# Patient Record
Sex: FEMALE | Race: WHITE | NOT HISPANIC OR LATINO | Employment: UNEMPLOYED | URBAN - NONMETROPOLITAN AREA
[De-identification: names, ages, dates, MRNs, and addresses within clinical notes are randomized per-mention and may not be internally consistent; named-entity substitution may affect disease eponyms.]

---

## 2017-04-24 ENCOUNTER — HISTORY (OUTPATIENT)
Dept: FAMILY MEDICINE | Facility: OTHER | Age: 42
End: 2017-04-24

## 2017-04-24 ENCOUNTER — OFFICE VISIT - GICH (OUTPATIENT)
Dept: FAMILY MEDICINE | Facility: OTHER | Age: 42
End: 2017-04-24

## 2017-04-24 DIAGNOSIS — R09.81 NASAL CONGESTION: ICD-10-CM

## 2017-04-24 DIAGNOSIS — H92.12 OTORRHEA OF LEFT EAR: ICD-10-CM

## 2017-04-24 DIAGNOSIS — H93.8X2 OTHER SPECIFIED DISORDERS OF LEFT EAR: ICD-10-CM

## 2017-10-02 ENCOUNTER — HISTORY (OUTPATIENT)
Dept: FAMILY MEDICINE | Facility: OTHER | Age: 42
End: 2017-10-02

## 2017-10-02 ENCOUNTER — OFFICE VISIT - GICH (OUTPATIENT)
Dept: FAMILY MEDICINE | Facility: OTHER | Age: 42
End: 2017-10-02

## 2017-10-02 DIAGNOSIS — L02.91 CUTANEOUS ABSCESS: ICD-10-CM

## 2017-11-07 ENCOUNTER — AMBULATORY - GICH (OUTPATIENT)
Dept: RADIOLOGY | Facility: OTHER | Age: 42
End: 2017-11-07

## 2017-11-07 DIAGNOSIS — Z12.31 ENCOUNTER FOR SCREENING MAMMOGRAM FOR MALIGNANT NEOPLASM OF BREAST: ICD-10-CM

## 2017-12-28 NOTE — PROGRESS NOTES
Patient Information     Patient Name MRN Sex Liliana Lawson [id number] Female 1975      Progress Notes by Chari Shabazz NP at 10/2/2017  4:15 PM     Author:  Chari Shabazz NP Service:  (none) Author Type:  PHYS- Nurse Practitioner     Filed:  10/2/2017  5:23 PM Encounter Date:  10/2/2017 Status:  Signed     :  Chari Shabazz NP (PHYS- Nurse Practitioner)            Nursing Notes:   Nathalie Mcmahon  10/2/2017  4:45 PM  Signed  Patient presents to the clinic for a sore on her neck that started 3 days ago.   Nathalie MARINELLI, CMA.......10/2/2017..4:31 PM    SUBJECTIVE:    Liliana Yao is a 42 y.o. female who presents for sore on neck    Abscess    This is a new problem. Episode onset: 3 days. The onset was sudden. The problem has been unchanged. The abscess is present on the neck. The problem is moderate. The abscess is characterized by painfulness, swelling and draining. It is unknown what she was exposed to. The abscess first occurred at home. Pertinent negatives include no anorexia, no decrease in physical activity, not sleeping less, not drinking less, no fever, no fussiness, no vomiting, no congestion, no rhinorrhea, no sore throat, no decreased responsiveness and no cough. Her past medical history does not include atopy in family or skin abscesses in family. There were no sick contacts. She has received no recent medical care.       Current Outpatient Prescriptions on File Prior to Visit       Medication  Sig Dispense Refill     aspirin chewable 81 mg chewable tablet Take 81 mg by mouth once daily with a meal.       blood sugar diagnostic (ONE TOUCH ULTRA TEST) strip As directed. Dispense test strips covered by the patient insurance. Test 3-4 times per day.       Cholecalciferol, Vitamin D3, (VITAMIN D-3) 5,000 unit tab Take 1 tablet by mouth once daily.  0     dextromethorphan-guaFENesin (ROBITUSSIN DM) ( mg in 5 mL) Take 10 mL by mouth every 6 hours if needed. 150 mL 0      "ergocalciferol (VITAMIN D2; DRISDOL) 50,000 unit capsule Take 1 capsule by mouth once weekly. 8 capsule 0     escitalopram (LEXAPRO) 20 mg tablet Take 20 mg by mouth once daily.       fluticasone (50 mcg per actuation) nasal solution (FLONASE) Inhale 1 Spray into both nostrils once daily. 1 Bottle 1     glipiZIDE extended-release (GLUCOTROL XL) 10 mg Extended-Release tablet TAKE 1 TABLET BY MOUTH ONCE DAILY BEFORE A MEAL 90 tablet 1     hydrOXYzine pamoate (VISTARIL) 25 mg capsule Take 1 capsule by mouth every 8 hours if needed.  0     levothyroxine (SYNTHROID) 75 mcg tablet TAKE 1 TABLET BY MOUTH ONCE DAILY 90 tablet 0     lisinopril (PRINIVIL; ZESTRIL) 10 mg tablet TAKE ONE TABLET BY MOUTH EVERY DAY 15 tablet 0     LORazepam (ATIVAN) 1 mg tablet Take 1 tablet by mouth 3 times daily.  0     metFORMIN (GLUCOPHAGE) 500 mg tablet TAKE 2 TABLETS BY MOUTH TWICE DAILY 120 tablet 0     NEEDLES, INSULIN DISPOSABLE (PEN NEEDLES MISC) As directed. 31 G x 6 MM . Use with Victoza to inject subcu once daily        simvastatin (ZOCOR) 20 mg tablet TAKE 1 TABLET BY MOUTH EVERY DAY 90 tablet 0     No current facility-administered medications on file prior to visit.        REVIEW OF SYSTEMS:  Review of Systems   Constitutional: Negative for decreased responsiveness and fever.   HENT: Negative for congestion, rhinorrhea and sore throat.    Respiratory: Negative for cough.    Gastrointestinal: Negative for anorexia and vomiting.       OBJECTIVE:  /62  Pulse 92  Temp 97.8  F (36.6  C) (Tympanic)  Ht 1.518 m (4' 11.75\")  Wt 85.7 kg (189 lb)  BMI 37.22 kg/m2    EXAM:   Physical Exam   Constitutional: She is well-developed, well-nourished, and in no distress.   HENT:   Head: Normocephalic and atraumatic.   Eyes: Conjunctivae are normal.   Cardiovascular: Normal rate.    Pulmonary/Chest: Effort normal. No respiratory distress.   Skin: Skin is warm and dry. There is erythema.   1 cm raised, red indurated lesion on the RT lateral " neck. No drainage, firm feeling. She states her SO already squeezed it last night.    Nursing note and vitals reviewed.      ASSESSMENT/PLAN:    ICD-10-CM    1. Abscess L02.91 mupirocin 2% topical (BACTROBAN) ointment        Plan:  Appears as though it could have started out as an ingrown hair. Topical rx sent in. No need for oral given size and appearance. F/u if worsens. Home cares and OTC gone over. I explained my diagnostic considerations and recommendations to the patient, who voiced understanding and agreement with the treatment plan. All questions were answered. We discussed potential side effects of any prescribed or recommended therapies, as well as expectations for response to treatments. She was advised to contact our office if there is no improvement or worsening of conditions or symptoms.  If s/s worsen or persist, patient will either come back or follow up with PCP.         UMA VENEGAS NP ....................  10/2/2017   5:23 PM

## 2017-12-28 NOTE — PATIENT INSTRUCTIONS
Patient Information     Patient Name MRN Liliana Lopez [id number] Female 1975      Patient Instructions by Chari Shabazz NP at 10/2/2017  4:15 PM     Author:  Chari Shabazz NP Service:  (none) Author Type:  PHYS- Nurse Practitioner     Filed:  10/2/2017  4:48 PM Encounter Date:  10/2/2017 Status:  Signed     :  Chari Shabazz NP (PHYS- Nurse Practitioner)            Topical ointment three times a day    Hot pack as needed.     Wash with antibacterial soap three times a day    OTC meds like Ibuprofen and Tylenol can help.

## 2017-12-30 NOTE — NURSING NOTE
Patient Information     Patient Name MRN Liliana Lopez [id number] Female 1975      Nursing Note by Nathalie Mcmahon at 10/2/2017  4:15 PM     Author:  Nathalie Mcmahon Service:  (none) Author Type:  (none)     Filed:  10/2/2017  4:45 PM Encounter Date:  10/2/2017 Status:  Signed     :  Nathalie Mcmahon            Patient presents to the clinic for a sore on her neck that started 3 days ago.   Nathalie MARINELLI, GONSALO.......10/2/2017..4:31 PM

## 2018-01-02 ENCOUNTER — OFFICE VISIT - GICH (OUTPATIENT)
Dept: FAMILY MEDICINE | Facility: OTHER | Age: 43
End: 2018-01-02

## 2018-01-02 ENCOUNTER — HISTORY (OUTPATIENT)
Dept: FAMILY MEDICINE | Facility: OTHER | Age: 43
End: 2018-01-02

## 2018-01-02 DIAGNOSIS — H66.92 OTITIS MEDIA OF LEFT EAR: ICD-10-CM

## 2018-01-04 NOTE — NURSING NOTE
Patient Information     Patient Name MRN Liliana Lopez [id number] Female 1975      Nursing Note by Sophie Dee at 2017  3:45 PM     Author:  Sophie Dee Service:  (none) Author Type:  (none)     Filed:  2017  4:24 PM Encounter Date:  2017 Status:  Signed     :  Sophie Dee            Patient reports that there is blood in her left ear. She noticed this yesterday.  Sophie Dee LPN ....................2017  4:07 PM

## 2018-01-04 NOTE — PROGRESS NOTES
Patient Information     Patient Name MRN Sex     Liliana Yao [id number] Female 1975      Progress Notes by Rosi Holcomb NP at 2017  3:45 PM     Author:  Rosi Holcomb NP Service:  (none) Author Type:  PHYS- Nurse Practitioner     Filed:  2017  8:36 PM Encounter Date:  2017 Status:  Signed     :  Rosi Holcomb NP (PHYS- Nurse Practitioner)            HPI:    Liliana Yao is a 41 y.o. female who presents to clinic today for what she thought was blood in the left ear. Saw it yesterday. Ear has been hurting off and on since yesterday. No sore throat, fever, headache. States cleans ears with Q-tips and looked like blood on Q-tip.   Nursing Notes:   Sophie Dee  2017  4:24 PM  Signed  Patient reports that there is blood in her left ear. She noticed this yesterday.  Sophie Dee LPN ....................2017  4:07 PM        Past Medical History:     Diagnosis  Date     ALLERGIC RHINITIS 2010     Cyst     Right palmar ganglion cyst      Delay, coagulation (HC)     She has cognitive delay      Diarrhea     Diarrhea since       Encounter for complete foot exam by podiatrist or other provider     Foot exam negative.  The patient is due for her annual eye exam.       Hx of self mutilation     none in last 10 years      Obesity     Morbid obesity, noncompliant      OTITIS MEDIA, BILATERAL 2012     Reactive airway disease      Wryneck     She has recurrent wryneck      Past Surgical History:      Procedure  Laterality Date     CYST REMOVAL      The patient had right ganglion cyst removal.       TYMPANOSTOMY      PE tubes at age 4 & 24       TYMPANOSTOMY  2008    Tube placement by Dr. Paco Izquierdo       Social History        Substance Use Topics          Smoking status:   Former Smoker      Packs/day:  0.50      Years:  12.00      Types:  Cigarettes      Quit date:  1997      Smokeless tobacco:   Never Used      Alcohol use   0.6 oz/week     1  Standard drinks or equivalent per week        Comment: 1 per month        Current Outpatient Prescriptions       Medication  Sig Dispense Refill     aspirin chewable 81 mg chewable tablet Take 81 mg by mouth once daily with a meal.       blood sugar diagnostic (ONE TOUCH ULTRA TEST) strip As directed. Dispense test strips covered by the patient insurance. Test 3-4 times per day.       Cholecalciferol, Vitamin D3, (VITAMIN D-3) 5,000 unit tab Take 1 tablet by mouth once daily.  0     dextromethorphan-guaFENesin (ROBITUSSIN DM) ( mg in 5 mL) Take 10 mL by mouth every 6 hours if needed. 150 mL 0     ergocalciferol (VITAMIN D2; DRISDOL) 50,000 unit capsule Take 1 capsule by mouth once weekly. 8 capsule 0     escitalopram (LEXAPRO) 20 mg tablet Take 20 mg by mouth once daily.       fluticasone (50 mcg per actuation) nasal solution (FLONASE) Inhale 1 Spray into both nostrils once daily. 1 Bottle 1     glipiZIDE extended-release (GLUCOTROL XL) 10 mg Extended-Release tablet TAKE 1 TABLET BY MOUTH ONCE DAILY BEFORE A MEAL 90 tablet 1     hydrOXYzine pamoate (VISTARIL) 25 mg capsule Take 1 capsule by mouth every 8 hours if needed.  0     levothyroxine (SYNTHROID) 75 mcg tablet TAKE 1 TABLET BY MOUTH ONCE DAILY 90 tablet 0     lisinopril (PRINIVIL; ZESTRIL) 10 mg tablet TAKE ONE TABLET BY MOUTH EVERY DAY 15 tablet 0     LORazepam (ATIVAN) 1 mg tablet Take 1 tablet by mouth 3 times daily.  0     metFORMIN (GLUCOPHAGE) 500 mg tablet TAKE 2 TABLETS BY MOUTH TWICE DAILY 120 tablet 0     NEEDLES, INSULIN DISPOSABLE (PEN NEEDLES MISC) As directed. 31 G x 6 MM . Use with Victoza to inject subcu once daily        simvastatin (ZOCOR) 20 mg tablet TAKE 1 TABLET BY MOUTH EVERY DAY 90 tablet 0     No current facility-administered medications for this visit.      Medications have been reviewed by me and are current to the best of my knowledge and ability.    Allergies      Allergen   Reactions     Acetone  Shortness Of Breath and  "Erythema     Sulindac  Rash     weakness        ROS:  Refer to HPI    /80  Pulse 78  Temp 97.7  F (36.5  C) (Tympanic)   Ht 1.422 m (4' 8\")  Wt 88.5 kg (195 lb)  BMI 43.72 kg/m2    EXAM:  General Appearance: Well appearing female, appropriate appearance for age. No acute distress  Head: normocephalic, atraumatic  Ears: Left TM with bony landmarks appreciated with cone of light, no erythema, dull, mild bulging, no purulence, no blood, scaring noted from previous tube.  Right TM with bony landmarks appreciated with cone of light, no erythema, dull, no bulging, no purulence.   Left auditory canal clear, Right auditory canal clear, normal external ears, non tender.  Eyes: conjunctivae normal, no drainage  Orophayrnx: moist mucous membranes, posterior pharynx, tonsils without hypertrophy, no erythema, no exudates or petechiae,  post nasal drip seen.  No sinus pain upon palpation of the frontal, maxillary, or ethmoid sinuses  Neck: supple without adenopathy  Respiratory: normal chest wall and respirations.  Normal effort.  Clear to auscultation bilaterally, no wheezes or rhonchi or congestion, no cough appreciated,   Cardiac: RRR with no murmurs  Musculoskeletal:full ROM  Dermatological: no rashes or lesions  Psychological: normal affect, alert and pleasant    ASSESSMENT/PLAN:    ICD-10-CM    1. Ear drainage, left H92.12    2. Ear congestion, left H93.8X2    3. Congestion of nasal sinus R09.81      Findings of exam discussed. To stop using Q-tips.    Symptomatic treatments: Take Sudafed as directed. May apply warm wash cloths to face and side of head. Steam therapy  Tylenol or ibuprofen PRN  Follow up if symptoms persist or worsen  States understanding.    Patient Instructions      Index [place]   Earache   ________________________________________________________________________  KEY POINTS    An earache is pain inside or around the ear. It may come and go or it may be constant.    The symptoms of ear " infections often go away in a couple of days. If you have earwax or something stuck in your ear, it should be removed.    If you are taking an antibiotic, take the medicine for as long as your healthcare provider prescribes, even if you feel better.    Ask your provider if there are activities you should avoid and when you can return to your normal activities.  ________________________________________________________________________  What is an earache?  An earache is pain inside or around the ear. It may come and go or it may be constant. You may also have decreased hearing or a feeling of pressure or blockage.   What is the cause?  Earaches can be caused by:    Injury to the ear or jaw    A problem with your teeth, usually in the upper molars    Changes in altitude or air pressure, for example, when you fly in an airplane or drive up into a mountain area    Cold air, wind, or a sudden change in temperature, such as going into a warm room after you have been outdoors in cold weather    A buildup of earwax    Having something stuck in your ear  Middle ear infection is a common cause of earache. It often starts when you have a cold, sinus infection, or throat infection. The infection may cause other symptoms, such as green or yellow drainage from the nose, fever, dizziness, loss of appetite, hearing loss, and a feeling of blockage in the ear.  Your ear canal may get infected when moisture and bacteria or a fungus gets trapped in the ear. This infection can spread to the outer part of your ear. It can cause pain in or around your ear or when you touch your earlobe. You may have redness, swelling, or itching of the ear. You may also have drainage from your ear canal or trouble hearing.  How is it diagnosed?  Your healthcare provider will ask about your symptoms and medical history and examine you.   How is it treated?  The symptoms of ear infections often go away in a couple of days. Your healthcare provider may wait 1  to 3 days to see if the symptoms go away before prescribing an antibiotic. Taking antibiotics when you don t need them can cause problems.  If you have earwax or something stuck in your ear, it should be removed. There are products you can buy at most drug stores to help soften and remove earwax. It is very important that you not push earwax or a foreign object (like an insect) further into the ear. You may need to see your healthcare provider to remove the earwax or the object stuck in your ear.  Other treatments depend on the cause of the earache. For example, chewing gum, drinking fluids, or sucking on candy may help stop pain caused by temperature changes or the change in pressure when you are going up or coming down in an airplane. Another way to try to relieve pressure in the ear is to blow out gently while keeping your mouth closed and nose pinched.  How can I take care of myself?  Follow the full course of treatment prescribed by your healthcare provider. In addition:    If you are taking an antibiotic, take the medicine for as long as your healthcare provider prescribes, even if you feel better. If you stop taking the medicine too soon, you may not kill all of the bacteria and you may get sick again.    For pain relief, either a cold pack or cold wet cloth or a warm moist washcloth or a covered hot water bottle on the ear for 20 minutes may help.    Take nonprescription pain medicine, such as acetaminophen, ibuprofen, or naproxen. Read the label and take as directed. Unless recommended by your healthcare provider, you should not take these medicines for more than 10 days.    Nonsteroidal anti-inflammatory medicines (NSAIDs), such as ibuprofen, naproxen, and aspirin, may cause stomach bleeding and other problems. These risks increase with age.    Acetaminophen may cause liver damage or other problems. Unless recommended by your provider, don't take more than 3000 milligrams (mg) in 24 hours. To make sure you  don t take too much, check other medicines you take to see if they also contain acetaminophen. Ask your provider if you need to avoid drinking alcohol while taking this medicine.    Don t use any eardrops for an earache if there is drainage from the ear or there are tubes in the ears unless the drops are prescribed by your healthcare provider.  Ask your provider:    How long it will take to recover    If there are activities you should avoid and when you can return to your normal activities    How to take care of yourself at home    What symptoms or problems you should watch for and what to do if you have them  Make sure you know when you should come back for a checkup. Keep all appointments for provider visits or tests.  Developed by [employer].  Adult Advisor 2016.3 published by [employer].  Last modified: 2016-06-02  Last reviewed: 2014-09-04  This content is reviewed periodically and is subject to change as new health information becomes available. The information is intended to inform and educate and is not a replacement for medical evaluation, advice, diagnosis or treatment by a healthcare professional.  References   Adult Advisor 2016.3 Index    Copyright   2016 [employer], a division of [employer] All rights reserved.          Index Maori Related topics   Colds   ________________________________________________________________________  KEY POINTS    Colds are an infection of the head and chest caused by a virus. They can affect your nose, throat, sinuses, eyes, and ears, as well as your windpipe, voice box, and the airways in your lungs.    Most of the time, you don t need to see your healthcare provider for treatment. There are no medicines that cure a cold. Medicines that you can buy at most drugstores can help relieve your symptoms.    To prevent getting or spreading a cold, wash your hands often and especially after using the restroom, coughing, sneezing, or blowing your nose. Also  wash your hands before eating or touching your eyes.  ________________________________________________________________________  What are colds?  Colds are an infection of the head and chest caused by a virus. They are a type of upper respiratory infection (URI). They can affect your nose, throat, sinuses, eyes, and ears. A cold can also affect the tube that connects your middle ear and throat, as well as your windpipe, voice box, and the airways in your lungs.  What is the cause?  Over 200 different viruses can cause colds. The infection spreads when viruses are passed to others by sneezing, coughing, or touching. You may also become infected by handling objects that were touched by someone with a cold. Some of the cold viruses can live on the skin and on objects, such as doorknobs or telephones for hours.  You are more likely to get a cold if:    You are stressed.    You are tired.    You do not eat a healthy diet.    You are a smoker or are exposed to secondhand smoke.    You are living or working in crowded conditions.    You don t wash your hands often.  People tend to get fewer colds as they get older because they have already had many colds and their immune system is able to fight off some of the viruses that can cause colds.  What are the symptoms?  You usually start having cold symptoms 1 to 3 days after contact with a cold virus. Symptoms may include:    Scratchy or sore throat    Sneezing    Runny or stuffy nose    Cough    Watery eyes    Ears that feel stuffy or blocked    Slight fever (99 to 100 F, or 37.2 to 37.8 C)    Feeling tired    Headache    Loss of appetite  Cold and flu symptoms are similar. The difference is that when you have the flu, the symptoms start within a few hours. The symptoms of a cold develop more slowly.  How are they treated?  Most of the time, you don t need to see your healthcare provider for treatment. There are no medicines that cure a cold. Medicines that you can buy at most  drugstores can help relieve your symptoms. You can:    Get lots of rest.    Drink extra fluids, such as water, fruit juice, and tea.    Use a humidifier to put more moisture in the air. Avoid steam vaporizers because they can cause burns. Be sure to keep the humidifier clean, as recommended in the 's instructions. It's important to prevent bacteria and mold from growing in the water container.    Take nonprescription medicine, such as acetaminophen, ibuprofen, or naproxen to treat pain and fever. Read the label and take as directed. Unless recommended by your healthcare provider, you should not take these medicines for more than 10 days.    Nonsteroidal anti-inflammatory medicines (NSAIDs), such as ibuprofen, naproxen, and aspirin, may cause stomach bleeding and other problems. These risks increase with age.    Acetaminophen may cause liver damage or other problems. Unless recommended by your provider, don't take more than 3,000 milligrams (mg) in 24 hours. To make sure you don t take too much, check other medicines you take to see if they also contain acetaminophen. Ask your provider if you need to avoid drinking alcohol while taking this medicine.    Decongestants pills or nasal spray may reduce swelling in your nose and sinuses and lessen the amount of mucus. Use decongestants as directed. If you are using a nonprescription nasal-spray decongestant, generally you should not use it for more than 3 days. After 3 days it may make your symptoms worse. Ask your healthcare provider if it is OK for you to use a nasal spray decongestant longer than this.    Use cough drops, pain relievers, or salt water gargles for a sore throat. You can make a salt water gargle with 1 teaspoon table salt in 1 cup (8 ounces) of warm water.    You can buy many different medicines for coughs without a prescription. However, there is no proof that they will actually help your cough. Cough medicines may cause harm to young  "children, but they are generally safe for older children and adults.    If you need relief from a dry, hacking cough, choose a medicine labeled \"cough suppressant.\" A cough suppressant may help you cough less and sleep better. Cough medicines with the initials DM in the name contain the suppressant drug called dextromethorphan.    If you need to loosen mucus, choose a medicine labeled \"expectorant.\" Expectorants may help keep your mucus thin and bring it up from the lungs when you cough. This may relieve chest congestion and make it easier to breathe. The drug used most often as an expectorant is guaifenesin.    Do not give a child under age 4 any cough and cold medicines unless you have instructions from your healthcare provider. Children over 6 years of age may be given cough drops or hard candies to relieve a sore throat or cough.    If you are pregnant, check first with your healthcare provider before taking any cold or cough medicines.  Colds usually last 1 to 2 weeks. Contact your healthcare provider if you have new or worsening symptoms or your symptoms last longer than 2 weeks.  How can I help prevent colds?  If you are sick, you can help protect others if you:    Don t go to work or school. Avoid contact with other people except to get medical care.    Cover your nose and mouth with a tissue when you cough or sneeze. Throw the tissue in the trash after you use it, and then wash your hands. If you don t have a tissue, cough or sneeze into your upper sleeve instead of your hands.    Clean your hands often with soap and water or an alcohol-based hand , especially after using tissues or coughing or sneezing into your hands.  To lower your risk of catching a cold:    Wash your hands often and especially after using the restroom, coughing, sneezing, or blowing your nose. Also wash your hands before eating or touching your eyes.    Stay at least 6 feet away from people who are sick, if you can.    Keep " surfaces clean--especially bedside tables, surfaces in the bathroom, and toys for children. Some viruses and bacteria can live for hours on surfaces like cafeteria tables, doorknobs, and desks. Wipe them down with a household disinfectant according to directions on the label.    Take care of your health. Try to get at least 7 to 9 hours of sleep each night. Eat a healthy diet and try to keep a healthy weight. If you smoke, try to quit. If you want to drink alcohol, ask your healthcare provider how much is safe for you to drink. Learn ways to manage stress. Exercise according to your healthcare provider's instructions.  Developed by [employer].  Adult Advisor 2016.3 published by [employer].  Last modified: 2016-07-28  Last reviewed: 2016-07-28  This content is reviewed periodically and is subject to change as new health information becomes available. The information is intended to inform and educate and is not a replacement for medical evaluation, advice, diagnosis or treatment by a healthcare professional.  References   Adult Advisor 2016.3 Index    Copyright   2016 [employer], a division of [employer] All rights reserved.         Sinus congestion/drainage:    Treat symptomatically with warm salt water gargles, Lozenges, and cough syrup per package direction. Using a humidifier or steam therapy by tea pot works well to break up the congestion.  Use a humidifier in your bedroom at night. You can also sleep propped up on a couple pillows to decrease symptoms at night.     Use a Neti Pot/sinus flush (Efren Med Sinus Rinse) 3 times daily to irrigate sinuses/mucosal tissue. Use nasal saline spray frequently throughout the day and night to keep nasal passages moist.    Sudafed 30 mg work well for congestion. Claritin drys up secretions.   If you choose pseudoephedrine, use for only 5-7 days AS DIRECTED. Speak to your pharmacist if you have any concerns about your medications. May also use decongestant  nasal spray, but only for 3 days MAXIMUM.    May use symptomatic care with either Tylenol, Ibuprofen, Advil, or Aleve as needed per package direction.  Frequent swallows of cool liquid may be helpful.  Honey coats the throat and some patients find it soothes the pain.     Monitor for any fevers or chills. Return in 7-10 days if not feeling better. Please call clinic with any questions or concerns. Please take in a lot of fluids and get rest.       Handouts reviewed and given. Patient states understanding of plan        TREY BRISENO NP ....................  4/24/2017   4:24 PM

## 2018-01-04 NOTE — PATIENT INSTRUCTIONS
Patient Information     Patient Name MRN Liliana Lopez [id number] Female 1975      Patient Instructions by Rosi Holcomb NP at 2017  3:45 PM     Author:  Rosi Holcomb NP Service:  (none) Author Type:  PHYS- Nurse Practitioner     Filed:  2017  4:36 PM Encounter Date:  2017 Status:  Signed     :  Rosi Holcomb NP (PHYS- Nurse Practitioner)               Index Ukrainian   Earache   ________________________________________________________________________  KEY POINTS    An earache is pain inside or around the ear. It may come and go or it may be constant.    The symptoms of ear infections often go away in a couple of days. If you have earwax or something stuck in your ear, it should be removed.    If you are taking an antibiotic, take the medicine for as long as your healthcare provider prescribes, even if you feel better.    Ask your provider if there are activities you should avoid and when you can return to your normal activities.  ________________________________________________________________________  What is an earache?  An earache is pain inside or around the ear. It may come and go or it may be constant. You may also have decreased hearing or a feeling of pressure or blockage.   What is the cause?  Earaches can be caused by:    Injury to the ear or jaw    A problem with your teeth, usually in the upper molars    Changes in altitude or air pressure, for example, when you fly in an airplane or drive up into a mountain area    Cold air, wind, or a sudden change in temperature, such as going into a warm room after you have been outdoors in cold weather    A buildup of earwax    Having something stuck in your ear  Middle ear infection is a common cause of earache. It often starts when you have a cold, sinus infection, or throat infection. The infection may cause other symptoms, such as green or yellow drainage from the nose, fever, dizziness, loss of appetite,  hearing loss, and a feeling of blockage in the ear.  Your ear canal may get infected when moisture and bacteria or a fungus gets trapped in the ear. This infection can spread to the outer part of your ear. It can cause pain in or around your ear or when you touch your earlobe. You may have redness, swelling, or itching of the ear. You may also have drainage from your ear canal or trouble hearing.  How is it diagnosed?  Your healthcare provider will ask about your symptoms and medical history and examine you.   How is it treated?  The symptoms of ear infections often go away in a couple of days. Your healthcare provider may wait 1 to 3 days to see if the symptoms go away before prescribing an antibiotic. Taking antibiotics when you don t need them can cause problems.  If you have earwax or something stuck in your ear, it should be removed. There are products you can buy at most drug stores to help soften and remove earwax. It is very important that you not push earwax or a foreign object (like an insect) further into the ear. You may need to see your healthcare provider to remove the earwax or the object stuck in your ear.  Other treatments depend on the cause of the earache. For example, chewing gum, drinking fluids, or sucking on candy may help stop pain caused by temperature changes or the change in pressure when you are going up or coming down in an airplane. Another way to try to relieve pressure in the ear is to blow out gently while keeping your mouth closed and nose pinched.  How can I take care of myself?  Follow the full course of treatment prescribed by your healthcare provider. In addition:    If you are taking an antibiotic, take the medicine for as long as your healthcare provider prescribes, even if you feel better. If you stop taking the medicine too soon, you may not kill all of the bacteria and you may get sick again.    For pain relief, either a cold pack or cold wet cloth or a warm moist washcloth  or a covered hot water bottle on the ear for 20 minutes may help.    Take nonprescription pain medicine, such as acetaminophen, ibuprofen, or naproxen. Read the label and take as directed. Unless recommended by your healthcare provider, you should not take these medicines for more than 10 days.    Nonsteroidal anti-inflammatory medicines (NSAIDs), such as ibuprofen, naproxen, and aspirin, may cause stomach bleeding and other problems. These risks increase with age.    Acetaminophen may cause liver damage or other problems. Unless recommended by your provider, don't take more than 3000 milligrams (mg) in 24 hours. To make sure you don t take too much, check other medicines you take to see if they also contain acetaminophen. Ask your provider if you need to avoid drinking alcohol while taking this medicine.    Don t use any eardrops for an earache if there is drainage from the ear or there are tubes in the ears unless the drops are prescribed by your healthcare provider.  Ask your provider:    How long it will take to recover    If there are activities you should avoid and when you can return to your normal activities    How to take care of yourself at home    What symptoms or problems you should watch for and what to do if you have them  Make sure you know when you should come back for a checkup. Keep all appointments for provider visits or tests.  Developed by [employer].  Adult Advisor 2016.3 published by [employer].  Last modified: 2016-06-02  Last reviewed: 2014-09-04  This content is reviewed periodically and is subject to change as new health information becomes available. The information is intended to inform and educate and is not a replacement for medical evaluation, advice, diagnosis or treatment by a healthcare professional.  References   Adult Advisor 2016.3 Index    Copyright   2016 [employer], a division of [employer] All rights reserved.          Index Faroese Related topics   Colds    ________________________________________________________________________  KEY POINTS    Colds are an infection of the head and chest caused by a virus. They can affect your nose, throat, sinuses, eyes, and ears, as well as your windpipe, voice box, and the airways in your lungs.    Most of the time, you don t need to see your healthcare provider for treatment. There are no medicines that cure a cold. Medicines that you can buy at most drugstores can help relieve your symptoms.    To prevent getting or spreading a cold, wash your hands often and especially after using the restroom, coughing, sneezing, or blowing your nose. Also wash your hands before eating or touching your eyes.  ________________________________________________________________________  What are colds?  Colds are an infection of the head and chest caused by a virus. They are a type of upper respiratory infection (URI). They can affect your nose, throat, sinuses, eyes, and ears. A cold can also affect the tube that connects your middle ear and throat, as well as your windpipe, voice box, and the airways in your lungs.  What is the cause?  Over 200 different viruses can cause colds. The infection spreads when viruses are passed to others by sneezing, coughing, or touching. You may also become infected by handling objects that were touched by someone with a cold. Some of the cold viruses can live on the skin and on objects, such as doorknobs or telephones for hours.  You are more likely to get a cold if:    You are stressed.    You are tired.    You do not eat a healthy diet.    You are a smoker or are exposed to secondhand smoke.    You are living or working in crowded conditions.    You don t wash your hands often.  People tend to get fewer colds as they get older because they have already had many colds and their immune system is able to fight off some of the viruses that can cause colds.  What are the symptoms?  You usually start having cold  symptoms 1 to 3 days after contact with a cold virus. Symptoms may include:    Scratchy or sore throat    Sneezing    Runny or stuffy nose    Cough    Watery eyes    Ears that feel stuffy or blocked    Slight fever (99 to 100 F, or 37.2 to 37.8 C)    Feeling tired    Headache    Loss of appetite  Cold and flu symptoms are similar. The difference is that when you have the flu, the symptoms start within a few hours. The symptoms of a cold develop more slowly.  How are they treated?  Most of the time, you don t need to see your healthcare provider for treatment. There are no medicines that cure a cold. Medicines that you can buy at most drugstores can help relieve your symptoms. You can:    Get lots of rest.    Drink extra fluids, such as water, fruit juice, and tea.    Use a humidifier to put more moisture in the air. Avoid steam vaporizers because they can cause burns. Be sure to keep the humidifier clean, as recommended in the 's instructions. It's important to prevent bacteria and mold from growing in the water container.    Take nonprescription medicine, such as acetaminophen, ibuprofen, or naproxen to treat pain and fever. Read the label and take as directed. Unless recommended by your healthcare provider, you should not take these medicines for more than 10 days.    Nonsteroidal anti-inflammatory medicines (NSAIDs), such as ibuprofen, naproxen, and aspirin, may cause stomach bleeding and other problems. These risks increase with age.    Acetaminophen may cause liver damage or other problems. Unless recommended by your provider, don't take more than 3,000 milligrams (mg) in 24 hours. To make sure you don t take too much, check other medicines you take to see if they also contain acetaminophen. Ask your provider if you need to avoid drinking alcohol while taking this medicine.    Decongestants pills or nasal spray may reduce swelling in your nose and sinuses and lessen the amount of mucus. Use  "decongestants as directed. If you are using a nonprescription nasal-spray decongestant, generally you should not use it for more than 3 days. After 3 days it may make your symptoms worse. Ask your healthcare provider if it is OK for you to use a nasal spray decongestant longer than this.    Use cough drops, pain relievers, or salt water gargles for a sore throat. You can make a salt water gargle with 1 teaspoon table salt in 1 cup (8 ounces) of warm water.    You can buy many different medicines for coughs without a prescription. However, there is no proof that they will actually help your cough. Cough medicines may cause harm to young children, but they are generally safe for older children and adults.    If you need relief from a dry, hacking cough, choose a medicine labeled \"cough suppressant.\" A cough suppressant may help you cough less and sleep better. Cough medicines with the initials DM in the name contain the suppressant drug called dextromethorphan.    If you need to loosen mucus, choose a medicine labeled \"expectorant.\" Expectorants may help keep your mucus thin and bring it up from the lungs when you cough. This may relieve chest congestion and make it easier to breathe. The drug used most often as an expectorant is guaifenesin.    Do not give a child under age 4 any cough and cold medicines unless you have instructions from your healthcare provider. Children over 6 years of age may be given cough drops or hard candies to relieve a sore throat or cough.    If you are pregnant, check first with your healthcare provider before taking any cold or cough medicines.  Colds usually last 1 to 2 weeks. Contact your healthcare provider if you have new or worsening symptoms or your symptoms last longer than 2 weeks.  How can I help prevent colds?  If you are sick, you can help protect others if you:    Don t go to work or school. Avoid contact with other people except to get medical care.    Cover your nose and " mouth with a tissue when you cough or sneeze. Throw the tissue in the trash after you use it, and then wash your hands. If you don t have a tissue, cough or sneeze into your upper sleeve instead of your hands.    Clean your hands often with soap and water or an alcohol-based hand , especially after using tissues or coughing or sneezing into your hands.  To lower your risk of catching a cold:    Wash your hands often and especially after using the restroom, coughing, sneezing, or blowing your nose. Also wash your hands before eating or touching your eyes.    Stay at least 6 feet away from people who are sick, if you can.    Keep surfaces clean--especially bedside tables, surfaces in the bathroom, and toys for children. Some viruses and bacteria can live for hours on surfaces like cafeteria tables, doorknobs, and desks. Wipe them down with a household disinfectant according to directions on the label.    Take care of your health. Try to get at least 7 to 9 hours of sleep each night. Eat a healthy diet and try to keep a healthy weight. If you smoke, try to quit. If you want to drink alcohol, ask your healthcare provider how much is safe for you to drink. Learn ways to manage stress. Exercise according to your healthcare provider's instructions.  Developed by [employer].  Adult Advisor 2016.3 published by [employer].  Last modified: 2016-07-28  Last reviewed: 2016-07-28  This content is reviewed periodically and is subject to change as new health information becomes available. The information is intended to inform and educate and is not a replacement for medical evaluation, advice, diagnosis or treatment by a healthcare professional.  References   Adult Advisor 2016.3 Index    Copyright   2016 [employer], a division of [employer] All rights reserved.         Sinus congestion/drainage:    Treat symptomatically with warm salt water gargles, Lozenges, and cough syrup per package direction. Using a  humidifier or steam therapy by tea pot works well to break up the congestion.  Use a humidifier in your bedroom at night. You can also sleep propped up on a couple pillows to decrease symptoms at night.     Use a Neti Pot/sinus flush (Efren Med Sinus Rinse) 3 times daily to irrigate sinuses/mucosal tissue. Use nasal saline spray frequently throughout the day and night to keep nasal passages moist.    Sudafed 30 mg work well for congestion. Claritin drys up secretions.   If you choose pseudoephedrine, use for only 5-7 days AS DIRECTED. Speak to your pharmacist if you have any concerns about your medications. May also use decongestant nasal spray, but only for 3 days MAXIMUM.    May use symptomatic care with either Tylenol, Ibuprofen, Advil, or Aleve as needed per package direction.  Frequent swallows of cool liquid may be helpful.  Honey coats the throat and some patients find it soothes the pain.     Monitor for any fevers or chills. Return in 7-10 days if not feeling better. Please call clinic with any questions or concerns. Please take in a lot of fluids and get rest.       Handouts reviewed and given. Patient states understanding of plan

## 2018-01-26 VITALS
HEIGHT: 56 IN | WEIGHT: 195 LBS | TEMPERATURE: 97.7 F | BODY MASS INDEX: 43.87 KG/M2 | HEART RATE: 78 BPM | SYSTOLIC BLOOD PRESSURE: 126 MMHG | DIASTOLIC BLOOD PRESSURE: 80 MMHG

## 2018-01-26 VITALS
WEIGHT: 189 LBS | DIASTOLIC BLOOD PRESSURE: 62 MMHG | TEMPERATURE: 97.8 F | BODY MASS INDEX: 37.11 KG/M2 | HEIGHT: 60 IN | HEART RATE: 92 BPM | SYSTOLIC BLOOD PRESSURE: 118 MMHG

## 2018-02-08 ENCOUNTER — DOCUMENTATION ONLY (OUTPATIENT)
Dept: FAMILY MEDICINE | Facility: OTHER | Age: 43
End: 2018-02-08

## 2018-02-08 PROBLEM — F41.1 ANXIETY STATE: Status: ACTIVE | Noted: 2018-02-08

## 2018-02-08 PROBLEM — E55.9 VITAMIN D DEFICIENCY: Status: ACTIVE | Noted: 2018-02-08

## 2018-02-08 PROBLEM — E11.9 DIABETES MELLITUS, TYPE II (H): Status: ACTIVE | Noted: 2018-02-08

## 2018-02-08 PROBLEM — F33.1 MAJOR DEPRESSIVE DISORDER, RECURRENT EPISODE, MODERATE (H): Status: ACTIVE | Noted: 2018-02-08

## 2018-02-08 PROBLEM — E66.01 MORBID OBESITY (H): Status: ACTIVE | Noted: 2018-02-08

## 2018-02-08 PROBLEM — Z91.52: Status: ACTIVE | Noted: 2018-02-08

## 2018-02-08 RX ORDER — LORAZEPAM 1 MG/1
1 TABLET ORAL 3 TIMES DAILY
COMMUNITY
Start: 2012-08-30 | End: 2022-04-27

## 2018-02-08 RX ORDER — LISINOPRIL 10 MG/1
10 TABLET ORAL DAILY
COMMUNITY
Start: 2015-04-08

## 2018-02-08 RX ORDER — ASPIRIN 81 MG/1
81 TABLET, CHEWABLE ORAL
COMMUNITY
End: 2018-02-27

## 2018-02-08 RX ORDER — SIMVASTATIN 20 MG
20 TABLET ORAL DAILY
COMMUNITY
Start: 2015-08-18

## 2018-02-08 RX ORDER — LEVOTHYROXINE SODIUM 75 UG/1
75 TABLET ORAL DAILY
COMMUNITY
Start: 2015-04-16

## 2018-02-08 RX ORDER — ELECTROLYTES/DEXTROSE
SOLUTION, ORAL ORAL
COMMUNITY

## 2018-02-08 RX ORDER — ESCITALOPRAM OXALATE 20 MG/1
20 TABLET ORAL DAILY
COMMUNITY

## 2018-02-09 VITALS
HEART RATE: 68 BPM | TEMPERATURE: 97.8 F | DIASTOLIC BLOOD PRESSURE: 88 MMHG | WEIGHT: 189 LBS | BODY MASS INDEX: 37.22 KG/M2 | SYSTOLIC BLOOD PRESSURE: 133 MMHG

## 2018-02-12 NOTE — PROGRESS NOTES
Patient Information     Patient Name MRN Sex Liliana Lawson [id number] Female 1975      Progress Notes by Brisa Staton MD at 2018  2:45 PM     Author:  Brisa Staton MD Service:  (none) Author Type:  Physician     Filed:  2018 11:58 AM Encounter Date:  2018 Status:  Signed     :  Brisa Staton MD (Physician)            SUBJECTIVE:  Liliana Yao is a 42 y.o. female who complains of ear pain.  Duration: 3 days  Severity: moderate and severe  Modifiers: nothing tried  Trend of symptoms: worsening  Fever: low grade fevers  Ear pain is: left sided pressure like and fullness sensation  Other symptoms include: sore throat  History of same illness: no prior history of similar illness  Ill contacts: no contacts known with similar symptoms  Current Outpatient Prescriptions       Medication  Sig Dispense Refill     amoxicillin (AMOXIL) 500 mg capsule Take 1 capsule by mouth 3 times daily for 10 days. 30 capsule 0     aspirin chewable 81 mg chewable tablet Take 81 mg by mouth once daily with a meal.       blood sugar diagnostic (ONE TOUCH ULTRA TEST) strip As directed. Dispense test strips covered by the patient insurance. Test 3-4 times per day.       escitalopram (LEXAPRO) 20 mg tablet Take 20 mg by mouth once daily.       levothyroxine (SYNTHROID) 75 mcg tablet TAKE 1 TABLET BY MOUTH ONCE DAILY 90 tablet 0     lisinopril (PRINIVIL; ZESTRIL) 10 mg tablet TAKE ONE TABLET BY MOUTH EVERY DAY 15 tablet 0     LORazepam (ATIVAN) 1 mg tablet Take 1 tablet by mouth 3 times daily.  0     metFORMIN (GLUCOPHAGE) 500 mg tablet TAKE 2 TABLETS BY MOUTH TWICE DAILY 120 tablet 0     NEEDLES, INSULIN DISPOSABLE (PEN NEEDLES MISC) As directed. 31 G x 6 MM . Use with Victoza to inject subcu once daily        simvastatin (ZOCOR) 20 mg tablet TAKE 1 TABLET BY MOUTH EVERY DAY 90 tablet 0     No current facility-administered medications for this visit.      Medications have been reviewed  by me and are current to the best of my knowledge and ability.    Allergies as of 01/02/2018 - [place] as Reviewed 01/02/2018      Allergen  Reaction Noted     Acetone Shortness Of Breath and Erythema 06/08/2015     Sulindac Rash 08/23/2012       OBJECTIVE:  /88 (Cuff Site: Right Arm, Position: Sitting, Cuff Size: Adult Regular)  Pulse 68  Temp 97.8  F (36.6  C) (Oral)   Wt 85.7 kg (189 lb)  LMP 12/11/2017  BMI 37.22 kg/m2  General appearance: healthy, alert and cooperative.   Left Ear: abnormal TM exam - erythematous, bulging  Right Ear: normal; external ear canal and TM clear, nontender.  Nose: normal mucosa  Oropharynx: normal pharynx and buccal mucosa. Dental hygeine adequate.  Neck: normal; supple with no masses or nodes.  Lungs:normal chest wall and respirations; clear to auscultation.  Heart: normal; regular rate and rhythm.  S1, S2, no murmur, click, gallop, or rubs.    ASSESSMENT/PLAN:    ICD-10-CM    1. Left otitis media, unspecified otitis media type H66.92 amoxicillin (AMOXIL) 500 mg capsule         Medications as per orders.   Symptomatic therapy suggested: use increased fluids and rest and acetaminophen prn.   Call or return to clinic prn if these symptoms worsen or fail to improve as anticipated.  Brisa Kendrick MD  11:58 AM 1/4/2018

## 2018-02-12 NOTE — NURSING NOTE
Patient Information     Patient Name MRN Liliana Lopez [id number] Female 1975      Nursing Note by Dannielle Barnes at 2018  2:45 PM     Author:  Dannielle Barnes Service:  (none) Author Type:  (none)     Filed:  2018  2:59 PM Encounter Date:  2018 Status:  Signed     :  Dannielle Barnes            Patient is here for ear pain, and plugged and draining. States had a cold.   Dannielle Barnes LPN .............2018  2:36 PM              Patient notified to schedule appointment with PCP for diabetic check And concerns of anxiety.  Dannielle Barnes LPN .............2018  2:33 PM

## 2018-02-27 ENCOUNTER — APPOINTMENT (OUTPATIENT)
Dept: GENERAL RADIOLOGY | Facility: OTHER | Age: 43
End: 2018-02-27
Attending: PHYSICIAN ASSISTANT
Payer: MEDICARE

## 2018-02-27 ENCOUNTER — HOSPITAL ENCOUNTER (EMERGENCY)
Facility: OTHER | Age: 43
Discharge: HOME | End: 2018-02-27
Attending: PHYSICIAN ASSISTANT | Admitting: PHYSICIAN ASSISTANT
Payer: MEDICARE

## 2018-02-27 VITALS
DIASTOLIC BLOOD PRESSURE: 95 MMHG | WEIGHT: 186 LBS | OXYGEN SATURATION: 95 % | RESPIRATION RATE: 20 BRPM | HEART RATE: 72 BPM | SYSTOLIC BLOOD PRESSURE: 144 MMHG | BODY MASS INDEX: 37.5 KG/M2 | HEIGHT: 59 IN | TEMPERATURE: 96.4 F

## 2018-02-27 DIAGNOSIS — M79.18 LUMBAR MUSCLE PAIN: ICD-10-CM

## 2018-02-27 DIAGNOSIS — S33.5XXA LUMBAR SPRAIN, INITIAL ENCOUNTER: ICD-10-CM

## 2018-02-27 PROCEDURE — 25000128 H RX IP 250 OP 636: Performed by: PHYSICIAN ASSISTANT

## 2018-02-27 PROCEDURE — 96372 THER/PROPH/DIAG INJ SC/IM: CPT | Performed by: PHYSICIAN ASSISTANT

## 2018-02-27 PROCEDURE — 99283 EMERGENCY DEPT VISIT LOW MDM: CPT | Mod: Z6 | Performed by: PHYSICIAN ASSISTANT

## 2018-02-27 PROCEDURE — 72100 X-RAY EXAM L-S SPINE 2/3 VWS: CPT

## 2018-02-27 PROCEDURE — 99284 EMERGENCY DEPT VISIT MOD MDM: CPT | Mod: 25 | Performed by: PHYSICIAN ASSISTANT

## 2018-02-27 PROCEDURE — 25000132 ZZH RX MED GY IP 250 OP 250 PS 637: Performed by: PHYSICIAN ASSISTANT

## 2018-02-27 RX ORDER — CYCLOBENZAPRINE HCL 10 MG
10 TABLET ORAL 3 TIMES DAILY PRN
Qty: 30 TABLET | Refills: 0 | Status: SHIPPED | OUTPATIENT
Start: 2018-02-27 | End: 2018-09-25

## 2018-02-27 RX ORDER — CYCLOBENZAPRINE HCL 10 MG
10 TABLET ORAL ONCE
Status: COMPLETED | OUTPATIENT
Start: 2018-02-27 | End: 2018-02-27

## 2018-02-27 RX ORDER — LORAZEPAM 2 MG/ML
0.5 INJECTION INTRAMUSCULAR ONCE
Status: COMPLETED | OUTPATIENT
Start: 2018-02-27 | End: 2018-02-27

## 2018-02-27 RX ORDER — FENTANYL CITRATE 50 UG/ML
50 INJECTION, SOLUTION INTRAMUSCULAR; INTRAVENOUS ONCE
Status: COMPLETED | OUTPATIENT
Start: 2018-02-27 | End: 2018-02-27

## 2018-02-27 RX ADMIN — FENTANYL CITRATE 50 MCG: 50 INJECTION INTRAMUSCULAR; INTRAVENOUS at 21:34

## 2018-02-27 RX ADMIN — LORAZEPAM 0.5 MG: 2 INJECTION, SOLUTION INTRAMUSCULAR; INTRAVENOUS at 21:34

## 2018-02-27 RX ADMIN — CYCLOBENZAPRINE HYDROCHLORIDE 10 MG: 10 TABLET, FILM COATED ORAL at 21:33

## 2018-02-27 NOTE — ED AVS SNAPSHOT
[facility]    [address]    [place] [address]    Phone:  [phone]    Fax:  [phone]                                       Liliana Yao   MRN: [id number]    Department:  [facility]   Date of Visit:  2/27/2018           Patient Information     Date Of Birth          1975        Your diagnoses for this visit were:     Lumbar sprain, initial encounter     Lumbar muscle pain        You were seen by Fransisco Brar PA-C.      Follow-up Information     Schedule an appointment as soon as possible for a visit with Khushbu Mccormick NP.    Why:  As needed, If symptoms worsen    Contact information:    [facility]  [address] [place] [place] [address] [phone]        Discharge References/Attachments     BACK PAIN, LOW: CAUSES OF LUMBAR (ENGLISH)    BACK PAIN, RELIEVING (ENGLISH)      24 Hour Appointment Hotline       To make an appointment at any [facility] clinic, call [phone] ([phone]). If you don't have a family doctor or clinic, we will help you find one. [facility] are conveniently located to serve the needs of you and your family.          ED Discharge Orders     PHYSICAL THERAPY REFERRAL       *This therapy referral will be filtered to a centralized scheduling office at [facility] and the patient will receive a call to schedule an appointment at a [facility] location most convenient for them. *     [facility] provides Physical Therapy evaluation and treatment and many specialty services across the [facility] system.  If requesting a specialty program, please choose from the list below.    If you have not heard from the scheduling office within 2 business days, please call [phone] for all locations, with the exception of [place], please call [phone] and [facility], please call [phone]  Treatment: Evaluation & Treatment  Special Instructions/Modalities:  "  Special Programs: None    Please be aware that coverage of these services is subject to the terms and limitations of your health insurance plan.  Call member services at your health plan with any benefit or coverage questions.      **Note to Provider:  If you are referring outside of [facility] for the therapy appointment, please list the name of the location in the \"special instructions\" above, print the referral and give to the patient to schedule the appointment.                     Review of your medicines      START taking        Dose / Directions Last dose taken    cyclobenzaprine 10 MG tablet   Commonly known as:  FLEXERIL   Dose:  10 mg   Quantity:  30 tablet        Take 1 tablet (10 mg) by mouth 3 times daily as needed for muscle spasms   Refills:  0          Our records show that you are taking the medicines listed below. If these are incorrect, please call your family doctor or clinic.        Dose / Directions Last dose taken    escitalopram 20 MG tablet   Commonly known as:  LEXAPRO   Dose:  20 mg        Take 20 mg by mouth daily   Refills:  0        glucagon 1 MG kit   Dose:  1 mg        Inject 1 mg into the muscle   Refills:  0        insulin detemir 100 UNIT/ML injection   Commonly known as:  LEVEMIR        65 units every day.   Refills:  0        levothyroxine 75 MCG tablet   Commonly known as:  SYNTHROID/LEVOTHROID   Dose:  75 mcg        Take 75 mcg by mouth daily   Refills:  0        lisinopril 10 MG tablet   Commonly known as:  PRINIVIL/ZESTRIL   Dose:  10 mg        Take 10 mg by mouth daily   Refills:  0        LORazepam 1 MG tablet   Commonly known as:  ATIVAN   Dose:  1 mg        Take 1 mg by mouth 3 times daily   Refills:  0        metFORMIN 500 MG tablet   Commonly known as:  GLUCOPHAGE   Dose:  1000 mg        Take 1,000 mg by mouth 2 times daily (with meals)   Refills:  0        ONETOUCH ULTRA test strip   Generic drug:  blood glucose monitoring        As directed. Dispense test strips " "covered by the patient insurance. Test 3-4 times per day.   Refills:  0        Pen Needles 29G X 12MM Misc        As directed. 31 G x 6 MM . Use with Victoza to inject subcu once daily   Refills:  0        simvastatin 20 MG tablet   Commonly known as:  ZOCOR   Dose:  20 mg        Take 20 mg by mouth daily   Refills:  0                Prescriptions were sent or printed at these locations (1 Prescription)                   [facility] & [address] [address]    Telephone:     Fax:     Hours:                  [facility] (1 of 1)         cyclobenzaprine (FLEXERIL) 10 MG tablet                Procedures and tests performed during your visit     XR Lumbar Spine 2/3 Views      Orders Needing Specimen Collection     None      Pending Results     Date and Time Order Name Status Description    2/27/2018 2112 XR Lumbar Spine 2/3 Views In process             Pending Culture Results     No orders found from 2/25/2018 to 2/28/2018.            Thank you for choosing [facility]       Thank you for choosing [facility] for your care. Our goal is always to provide you with excellent care. Hearing back from our patients is one way we can continue to improve our services. Please take a few minutes to complete the written survey that you may receive in the mail after you visit with us. Thank you!        [employer] Information     IP[employer] lets you send messages to your doctor, view your test results, renew your prescriptions, schedule appointments and more. To sign up, go to [email or web address] . Click on \"Log in\" on the left side of the screen, which will take you to the Welcome page. Then click on \"Sign up Now\" on the right side of the page.     You will be asked to enter the access code listed below, as well as some personal information. Please follow the directions to create your username and password.     Your access code is: [email or web address]  Expires: 5/28/2018 10:48 PM     Your access " code will  in 90 days. If you need help or a new code, please call your [facility] clinic or [phone].        Care EveryWhere ID     This is your Care EveryWhere ID. This could be used by other organizations to access your [facility] medical records  [id number]        Equal Access to Services     JOSE M ANGEL : Parveen zimmerman Soyovani, waaxda luqadaha, qaybta kaalmada manjit, cj dennis. So [facility] [phone].    ATENCIÓN: Si habla español, tiene a thapa disposición servicios gratuitos de asistencia lingüística. Llame al [phone].    We comply with applicable federal civil rights laws and [place] laws. We do not discriminate on the basis of race, color, national origin, age, disability, sex, sexual orientation, or gender identity.            After Visit Summary       This is your record. Keep this with you and show to your community pharmacist(s) and doctor(s) at your next visit.

## 2018-02-27 NOTE — ED AVS SNAPSHOT
[facility]    [address]    [place] [address]    Phone:  [phone]    Fax:  [phone]                                       Liliana Yao   MRN: [id number]    Department:  [facility]   Date of Visit:  2/27/2018           After Visit Summary Signature Page     I have received my discharge instructions, and my questions have been answered. I have discussed any challenges I see with this plan with the nurse or doctor.    ..........................................................................................................................................  Patient/Patient Representative Signature      ..........................................................................................................................................  Patient Representative Print Name and Relationship to Patient    ..................................................               ................................................  Date                                            Time    ..........................................................................................................................................  Reviewed by Signature/Title    ...................................................              ..............................................  Date                                                            Time

## 2018-02-28 ASSESSMENT — ENCOUNTER SYMPTOMS
FEVER: 0
DIFFICULTY URINATING: 0
HEADACHES: 0
BACK PAIN: 1
SHORTNESS OF BREATH: 0
EYE REDNESS: 0
ARTHRALGIAS: 0
ABDOMINAL PAIN: 0
NECK STIFFNESS: 0
CHILLS: 0
COLOR CHANGE: 0
CONFUSION: 0

## 2018-02-28 NOTE — ED NOTES
Lower back pain that started last night.  States it hurt so bad last night she was taking a shower and fell in the shower because of the pain.  Back is spasming.  Has taken Ibuprofen today and had ice on back with little relief.

## 2018-02-28 NOTE — ED PROVIDER NOTES
"  History     Chief Complaint   Patient presents with     Back Pain     HPI Comments: This is a 41 yo female who was wrestling with 14 yo relative whe she feels something \"popped\" in her back.  It was so painful that she could hardly stand up when taking a shower last night.   Denies any numbness or tingling to her legs or arms.  No loss of bowel or bladder control.      The history is provided by the patient and a relative.         Problem List:    Patient Active Problem List    Diagnosis Date Noted     Anxiety state 02/08/2018     Priority: Medium     Overview:   with psychotic features       Major depressive disorder, recurrent episode, moderate (H) 02/08/2018     Priority: Medium     Overview:   Clinical       Diabetes mellitus, type II (H) 02/08/2018     Priority: Medium     Overview:   Noncompliant  a1c = 8.9 on 4/14/15.   Referred to eye doctor.  On aspirin.  On statin/ace        Hx of self mutilation 02/08/2018     Priority: Medium     Overview:   none in last 10 years       Morbid obesity (H) 02/08/2018     Priority: Medium     Overview:   Noncompliant       Vitamin D deficiency 02/08/2018     Priority: Medium     Goiter 12/31/2012     Priority: Medium     Low back pain syndrome 08/29/2011     Priority: Medium     Essential hypertension, benign 05/18/2011     Priority: Medium     Knee pain 02/15/2011     Priority: Medium     Overview:   left       Calcaneal spur, left 12/01/2010     Priority: Medium     Plantar fasciitis 09/28/2010     Priority: Medium     Hearing loss 01/13/2010     Priority: Medium     Hypercholesterolemia 02/01/2007     Priority: Medium        Past Medical History:    Past Medical History:   Diagnosis Date     Allergic rhinitis      Coagulation defect (H)      Diarrhea      Encounter for other general examination (CODE)      Obesity      Otitis media      Personal history of other mental and behavioral disorders      Sebaceous cyst      Torticollis      Uncomplicated asthma        Past " Surgical History:    Past Surgical History:   Procedure Laterality Date     EXCISE CYST GENERIC (LOCATION)      The patient had right ganglion cyst removal.     TYMPANOSTOMY, LOCAL/TOPICAL ANESTHESIA      PE tubes at age 4 & 24     TYMPANOSTOMY, LOCAL/TOPICAL ANESTHESIA      07/08/2008,Tube placement by Dr. Paco Izquierdo       Family History:    Family History   Problem Relation Age of Onset     Hypertension Mother      Hypertension,Hypertension.     Other - See Comments Mother       Chronic heart failure.  Dense fibrocystic breast disease.     Breast Cancer Paternal Grandmother      Cancer-breast,breast cancer, mastectomy     DIABETES Father      Diabetes,Multiple members on father's side with diabetes     Breast Cancer Paternal Aunt      Cancer-breast, bilateral mastectomy for breast cancer       Social History:  Marital Status:  Single [1]  Social History   Substance Use Topics     Smoking status: Former Smoker     Packs/day: 0.50     Years: 12.00     Types: Cigarettes     Quit date: 9/13/1997     Smokeless tobacco: Never Used     Alcohol use 0.6 oz/week      Comment: Alcoholic Drinks/day: 1 per month        Medications:      insulin detemir (LEVEMIR) 100 UNIT/ML injection   escitalopram (LEXAPRO) 20 MG tablet   levothyroxine (SYNTHROID/LEVOTHROID) 75 MCG tablet   lisinopril (PRINIVIL/ZESTRIL) 10 MG tablet   LORazepam (ATIVAN) 1 MG tablet   metFORMIN (GLUCOPHAGE) 500 MG tablet   simvastatin (ZOCOR) 20 MG tablet   glucagon 1 MG kit   blood glucose monitoring (ONETOUCH ULTRA) test strip   Insulin Pen Needle (PEN NEEDLES) 29G X 12MM MISC         Review of Systems   Constitutional: Negative for chills and fever.   HENT: Negative for congestion.    Eyes: Negative for redness.   Respiratory: Negative for shortness of breath.    Cardiovascular: Negative for chest pain.   Gastrointestinal: Negative for abdominal pain.   Genitourinary: Negative for difficulty urinating.   Musculoskeletal: Positive for back pain. Negative  "for arthralgias and neck stiffness.   Skin: Negative for color change.   Neurological: Negative for headaches.   Psychiatric/Behavioral: Negative for confusion.       Physical Exam   BP: 126/74  Pulse: 72  Temp: 96.4  F (35.8  C)  Resp: 20  Height: 149.9 cm (4' 11\")  Weight: 84.4 kg (186 lb)  SpO2: 95 %      Physical Exam   Constitutional: She is oriented to person, place, and time. No distress.   HENT:   Head: Atraumatic.   Mouth/Throat: Oropharynx is clear and moist. No oropharyngeal exudate.   Eyes: Pupils are equal, round, and reactive to light. No scleral icterus.   Cardiovascular: Normal heart sounds and intact distal pulses.    Pulmonary/Chest: Breath sounds normal. No respiratory distress.   Abdominal: Soft. Bowel sounds are normal. There is no tenderness.   Musculoskeletal: Normal range of motion. She exhibits tenderness. She exhibits no edema.   Lumbar pain L4-S1   Full AROM.  But apprehension with flexion.  CMSx4   Neurological: She is alert and oriented to person, place, and time.   Skin: Skin is warm. No rash noted. She is not diaphoretic.       ED Course     ED Course     Procedures          Results for orders placed or performed during the hospital encounter of 02/27/18   XR Lumbar Spine 2/3 Views    Narrative    XR LUMBAR SPINE 2-3 VIEWS    HISTORY: 42 yearsFemale pain;     TECHNIQUE: 2 views lumbar spine    COMPARISON: None    FINDINGS: Vertebral body height is well-maintained. Disc space height  is well-maintained. There is normal alignment. There is no evidence of  fracture or subluxation. There is partial lumbarization of S1.      Impression    IMPRESSION: No evidence of fracture or subluxation of the lumbar  spine.    RISHI TOMLIN MD     Medications   fentaNYL (PF) (SUBLIMAZE) injection 50 mcg (50 mcg Intramuscular Given 2/27/18 2134)   cyclobenzaprine (FLEXERIL) tablet 10 mg (10 mg Oral Given 2/27/18 2133)   LORazepam (ATIVAN) injection 0.5 mg (0.5 mg Intramuscular Given 2/27/18 2134) "            Labs Ordered and Resulted from Time of ED Arrival Up to the Time of Departure from the ED - No data to display    Assessments & Plan (with Medical Decision Making)     I have reviewed the nursing notes.    I have reviewed the findings, diagnosis, plan and need for follow up with the patient.      Discharge Medication List as of 2/27/2018 10:48 PM      START taking these medications    Details   cyclobenzaprine (FLEXERIL) 10 MG tablet Take 1 tablet (10 mg) by mouth 3 times daily as needed for muscle spasms, Disp-30 tablet, R-0, [facility]             Final diagnoses:   Lumbar sprain, initial encounter   Lumbar muscle pain     Afebrile.  VSS.  Lumbar pain since last night after wrestling with kids.  She was given flexeril, ativan and fentanyl IM for pain relief and she feels this helped.  Lumbar films show no evidence of fracture or subluxation.  Discussed Ice and Heat therapy.  Referral to PT.   Rx for short course norco and flexeril.  She cannot take NSAIDS.  Follow up with her PCP if symptoms persist for further evaluation .   2/27/2018   [facility]     Fransisco Brar PA-C  02/28/18 1665

## 2018-03-25 ENCOUNTER — HOSPITAL ENCOUNTER (EMERGENCY)
Facility: OTHER | Age: 43
Discharge: HOME | End: 2018-03-25
Attending: FAMILY MEDICINE | Admitting: FAMILY MEDICINE
Payer: MEDICARE

## 2018-03-25 VITALS
TEMPERATURE: 97 F | SYSTOLIC BLOOD PRESSURE: 107 MMHG | HEIGHT: 59 IN | RESPIRATION RATE: 18 BRPM | DIASTOLIC BLOOD PRESSURE: 69 MMHG | WEIGHT: 181 LBS | OXYGEN SATURATION: 93 % | BODY MASS INDEX: 36.49 KG/M2

## 2018-03-25 DIAGNOSIS — N12 PYELONEPHRITIS: Primary | ICD-10-CM

## 2018-03-25 LAB
ALBUMIN SERPL-MCNC: 4 G/DL (ref 3.5–5.7)
ALBUMIN UR-MCNC: ABNORMAL MG/DL
ALP SERPL-CCNC: 98 U/L (ref 34–104)
ALT SERPL W P-5'-P-CCNC: 41 U/L (ref 7–52)
ANION GAP SERPL CALCULATED.3IONS-SCNC: 8 MMOL/L (ref 3–14)
APPEARANCE UR: ABNORMAL
AST SERPL W P-5'-P-CCNC: 21 U/L (ref 13–39)
BACTERIA #/AREA URNS HPF: ABNORMAL /HPF
BASOPHILS # BLD AUTO: 0 10E9/L (ref 0–0.2)
BASOPHILS NFR BLD AUTO: 0.4 %
BILIRUB SERPL-MCNC: 0.9 MG/DL (ref 0.3–1)
BILIRUB UR QL STRIP: NEGATIVE
BUN SERPL-MCNC: 11 MG/DL (ref 7–25)
CALCIUM SERPL-MCNC: 9.1 MG/DL (ref 8.6–10.3)
CHLORIDE SERPL-SCNC: 104 MMOL/L (ref 98–107)
CO2 SERPL-SCNC: 23 MMOL/L (ref 21–31)
COLOR UR AUTO: YELLOW
CREAT SERPL-MCNC: 0.56 MG/DL (ref 0.6–1.2)
DIFFERENTIAL METHOD BLD: NORMAL
EOSINOPHIL # BLD AUTO: 0.5 10E9/L (ref 0–0.7)
EOSINOPHIL NFR BLD AUTO: 5 %
ERYTHROCYTE [DISTWIDTH] IN BLOOD BY AUTOMATED COUNT: 13 % (ref 10–15)
GFR SERPL CREATININE-BSD FRML MDRD: >90 ML/MIN/1.7M2
GLUCOSE SERPL-MCNC: 304 MG/DL (ref 70–105)
GLUCOSE UR STRIP-MCNC: >1000 MG/DL
HCT VFR BLD AUTO: 38.3 % (ref 35–47)
HGB BLD-MCNC: 13.5 G/DL (ref 11.7–15.7)
HGB UR QL STRIP: NEGATIVE
IMM GRANULOCYTES # BLD: 0 10E9/L (ref 0–0.4)
IMM GRANULOCYTES NFR BLD: 0.3 %
KETONES UR STRIP-MCNC: NEGATIVE MG/DL
LACTATE SERPL-SCNC: 1.3 MMOL/L (ref 0.5–2.2)
LEUKOCYTE ESTERASE UR QL STRIP: NEGATIVE
LYMPHOCYTES # BLD AUTO: 1.7 10E9/L (ref 0.8–5.3)
LYMPHOCYTES NFR BLD AUTO: 17.6 %
MCH RBC QN AUTO: 29.8 PG (ref 26.5–33)
MCHC RBC AUTO-ENTMCNC: 35.2 G/DL (ref 31.5–36.5)
MCV RBC AUTO: 85 FL (ref 78–100)
MONOCYTES # BLD AUTO: 0.8 10E9/L (ref 0–1.3)
MONOCYTES NFR BLD AUTO: 7.8 %
NEUTROPHILS # BLD AUTO: 6.7 10E9/L (ref 1.6–8.3)
NEUTROPHILS NFR BLD AUTO: 68.9 %
NITRATE UR QL: NEGATIVE
NON-SQ EPI CELLS #/AREA URNS LPF: ABNORMAL /LPF
PH UR STRIP: 5 PH (ref 5–7)
PLATELET # BLD AUTO: 213 10E9/L (ref 150–450)
POTASSIUM SERPL-SCNC: 4.6 MMOL/L (ref 3.5–5.1)
PROT SERPL-MCNC: 8 G/DL (ref 6.4–8.9)
RBC # BLD AUTO: 4.53 10E12/L (ref 3.8–5.2)
RBC #/AREA URNS AUTO: ABNORMAL /HPF
SODIUM SERPL-SCNC: 135 MMOL/L (ref 134–144)
SOURCE: ABNORMAL
SP GR UR STRIP: 1.02 (ref 1–1.03)
UROBILINOGEN UR STRIP-ACNC: 0.2 EU/DL (ref 0.2–1)
WBC # BLD AUTO: 9.7 10E9/L (ref 4–11)
WBC #/AREA URNS AUTO: ABNORMAL /HPF

## 2018-03-25 PROCEDURE — A9270 NON-COVERED ITEM OR SERVICE: HCPCS | Mod: GY | Performed by: FAMILY MEDICINE

## 2018-03-25 PROCEDURE — 36415 COLL VENOUS BLD VENIPUNCTURE: CPT | Performed by: FAMILY MEDICINE

## 2018-03-25 PROCEDURE — 87086 URINE CULTURE/COLONY COUNT: CPT | Performed by: FAMILY MEDICINE

## 2018-03-25 PROCEDURE — 83605 ASSAY OF LACTIC ACID: CPT | Performed by: FAMILY MEDICINE

## 2018-03-25 PROCEDURE — 99284 EMERGENCY DEPT VISIT MOD MDM: CPT | Performed by: FAMILY MEDICINE

## 2018-03-25 PROCEDURE — 25000128 H RX IP 250 OP 636: Performed by: FAMILY MEDICINE

## 2018-03-25 PROCEDURE — 99284 EMERGENCY DEPT VISIT MOD MDM: CPT | Mod: Z6 | Performed by: FAMILY MEDICINE

## 2018-03-25 PROCEDURE — 85025 COMPLETE CBC W/AUTO DIFF WBC: CPT | Performed by: FAMILY MEDICINE

## 2018-03-25 PROCEDURE — 80053 COMPREHEN METABOLIC PANEL: CPT | Performed by: FAMILY MEDICINE

## 2018-03-25 PROCEDURE — 25000132 ZZH RX MED GY IP 250 OP 250 PS 637: Mod: GY | Performed by: FAMILY MEDICINE

## 2018-03-25 PROCEDURE — 81001 URINALYSIS AUTO W/SCOPE: CPT | Performed by: FAMILY MEDICINE

## 2018-03-25 PROCEDURE — 96372 THER/PROPH/DIAG INJ SC/IM: CPT | Performed by: FAMILY MEDICINE

## 2018-03-25 RX ORDER — CIPROFLOXACIN 250 MG/1
500 TABLET, FILM COATED ORAL 2 TIMES DAILY
Qty: 28 TABLET | Refills: 0 | Status: SHIPPED | OUTPATIENT
Start: 2018-03-25 | End: 2018-04-01

## 2018-03-25 RX ORDER — CEFTRIAXONE SODIUM 1 G
1 VIAL (EA) INJECTION ONCE
Status: COMPLETED | OUTPATIENT
Start: 2018-03-25 | End: 2018-03-25

## 2018-03-25 RX ORDER — OXYCODONE AND ACETAMINOPHEN 5; 325 MG/1; MG/1
1 TABLET ORAL ONCE
Status: COMPLETED | OUTPATIENT
Start: 2018-03-25 | End: 2018-03-25

## 2018-03-25 RX ORDER — PHENAZOPYRIDINE HYDROCHLORIDE 200 MG/1
200 TABLET, FILM COATED ORAL 3 TIMES DAILY PRN
Qty: 6 TABLET | Refills: 0 | Status: SHIPPED | OUTPATIENT
Start: 2018-03-25 | End: 2018-05-08

## 2018-03-25 RX ADMIN — CEFTRIAXONE SODIUM 1 G: 1 INJECTION, POWDER, FOR SOLUTION INTRAMUSCULAR; INTRAVENOUS at 20:50

## 2018-03-25 RX ADMIN — OXYCODONE HYDROCHLORIDE AND ACETAMINOPHEN 1 TABLET: 5; 325 TABLET ORAL at 19:40

## 2018-03-25 ASSESSMENT — ENCOUNTER SYMPTOMS
FLANK PAIN: 1
LIGHT-HEADEDNESS: 0
ADENOPATHY: 0
NAUSEA: 0
CHILLS: 0
VOMITING: 0
FEVER: 0
DIFFICULTY URINATING: 0
POLYDIPSIA: 0
DYSURIA: 1
POLYPHAGIA: 0
SHORTNESS OF BREATH: 0
CHEST TIGHTNESS: 0
FREQUENCY: 1
CHOKING: 0
COUGH: 0

## 2018-03-25 NOTE — ED NOTES
Checked on patient in waiting room and a clarification about her pain made this nurse aware of pain in flank, back and abdomen causing splinting of breathing.  Ordered UA and this collected and sent.  Another cold pack provided.

## 2018-03-25 NOTE — ED AVS SNAPSHOT
[facility]    [address]    [place] [address]    Phone:  [phone]    Fax:  [phone]                                       Liliana Yao   MRN: [id number]    Department:  [facility]   Date of Visit:  3/25/2018           Patient Information     Date Of Birth          1975        Your diagnoses for this visit were:     Pyelonephritis        You were seen by Tab Kendrick MD.      Follow-up Information     Follow up with Khushbu Mccormick NP.    Why:  As needed    Contact information:    [facility]  [address]  [place] [address] [phone]          Follow up with [facility].    Specialty:  EMERGENCY MEDICINE    Why:  If symptoms worsen    Contact information:    [address]  [place] [address] [phone]      Discharge References/Attachments     PYELONEPHRITIS, DISCHARGE INSTRUCTIONS FOR (ENGLISH)      24 Hour Appointment Hotline       To [facility] an [facility], call [phone] ([phone]). If you don't have a family doctor or clinic, we will help you find one. [facility] are conveniently located to serve the needs of you and your family.             Review of your medicines      START taking        Dose / Directions Last dose taken    ciprofloxacin 250 MG tablet   Commonly known as:  CIPRO   Dose:  500 mg   Quantity:  28 tablet        Take 2 tablets (500 mg) by mouth 2 times daily for 7 days   Refills:  0        phenazopyridine 200 MG tablet   Commonly known as:  PYRIDIUM   Dose:  200 mg   Quantity:  6 tablet        Take 1 tablet (200 mg) by mouth 3 times daily as needed for irritation   Refills:  0          Our records show that you are taking the medicines listed below. If these are incorrect, please call your family doctor or clinic.        Dose / Directions Last dose taken    cyclobenzaprine 10 MG tablet   Commonly known as:  FLEXERIL    Dose:  10 mg   Quantity:  30 tablet        Take 1 tablet (10 mg) by mouth 3 times daily as needed for muscle spasms   Refills:  0        escitalopram 20 MG tablet   Commonly known as:  LEXAPRO   Dose:  20 mg        Take 20 mg by mouth daily   Refills:  0        glucagon 1 MG kit   Dose:  1 mg        Inject 1 mg into the muscle   Refills:  0        insulin detemir 100 UNIT/ML injection   Commonly known as:  LEVEMIR        65 units every day.   Refills:  0        levothyroxine 75 MCG tablet   Commonly known as:  SYNTHROID/LEVOTHROID   Dose:  75 mcg        Take 75 mcg by mouth daily   Refills:  0        lisinopril 10 MG tablet   Commonly known as:  PRINIVIL/ZESTRIL   Dose:  10 mg        Take 10 mg by mouth daily   Refills:  0        LORazepam 1 MG tablet   Commonly known as:  ATIVAN   Dose:  1 mg        Take 1 mg by mouth 3 times daily   Refills:  0        metFORMIN 500 MG tablet   Commonly known as:  GLUCOPHAGE   Dose:  1000 mg        Take 1,000 mg by mouth 2 times daily (with meals)   Refills:  0        ONETOUCH ULTRA test strip   Generic drug:  blood glucose monitoring        As directed. Dispense test strips covered by the patient insurance. Test 3-4 times per day.   Refills:  0        Pen Needles 29G X 12MM Misc        As directed. 31 G x 6 MM . Use with Victoza to inject subcu once daily   Refills:  0        simvastatin 20 MG tablet   Commonly known as:  ZOCOR   Dose:  20 mg        Take 20 mg by mouth daily   Refills:  0                Prescriptions were sent or printed at these locations (2 Prescriptions)                   [facility]   [address] [place] [address]    Telephone:     Fax:     Hours:                  [facility] (2 of 2)         ciprofloxacin (CIPRO) 250 MG tablet               phenazopyridine (PYRIDIUM) 200 MG tablet                Procedures and tests performed during your visit     *UA reflex to Microscopic    CBC with platelets differential     "Comprehensive metabolic panel    Lactic acid    Urine Culture Aerobic Bacterial    Urine Microscopic      Orders Needing Specimen Collection     None      Pending Results     No orders found from 3/23/2018 to 3/26/2018.            Pending Culture Results     No orders found from 3/23/2018 to 3/26/2018.            Thank you for choosing [facility]       Thank you for choosing [facility] for your care. Our goal is always to provide you with excellent care. Hearing back from our patients is one way we can continue to improve our services. Please take a few minutes to complete the written survey that you may receive in the mail after you visit with us. Thank you!        [employer] Information     [employer] lets you send messages to your doctor, view your test results, renew your prescriptions, schedule appointments and more. To sign up, go to [email or web address] . Click on \"Log in\" on the left side of the screen, which will take you to the Welcome page. Then click on \"Sign up Now\" on the right side of the page.     You will be asked to enter the access code listed below, as well as some personal information. Please follow the directions to create your username and password.     Your access code is: [email or web address]  Expires: 2018 11:48 PM     Your access code will  in 90 days. If you need help or a new code, please call your [facility] clinic or [phone].        Care EveryWhere ID     This is your Care EveryWhere ID. This could be used by other organizations to access your [facility] medical records  [id number]        Equal Access to Services     JOSE M ANGEL AH: Hadii roscoe Oro, waaxda carole, qaybta kaalmada cj saravia. So [facility] [phone].    ATENCIÓN: Si habla español, tiene a thapa disposición servicios gratuitos de asistencia lingüística. Llame al [phone].    We comply with applicable federal civil rights laws and [place] laws. We do not discriminate " on the basis of race, color, national origin, age, disability, sex, sexual orientation, or gender identity.            After Visit Summary       This is your record. Keep this with you and show to your community pharmacist(s) and doctor(s) at your next visit.

## 2018-03-25 NOTE — ED AVS SNAPSHOT
[facility]    [address]    [place] [address]    Phone:  [phone]    Fax:  [phone]                                       Liliana Yao   MRN: [id number]    Department:  [facility]   Date of Visit:  3/25/2018           After Visit Summary Signature Page     I have received my discharge instructions, and my questions have been answered. I have discussed any challenges I see with this plan with the nurse or doctor.    ..........................................................................................................................................  Patient/Patient Representative Signature      ..........................................................................................................................................  Patient Representative Print Name and Relationship to Patient    ..................................................               ................................................  Date                                            Time    ..........................................................................................................................................  Reviewed by Signature/Title    ...................................................              ..............................................  Date                                                            Time

## 2018-03-25 NOTE — ED NOTES
"ED Nursing Triage Note (General)   ________________________________    Liliana Yao is a 42 year old Female that presents to triage private car  With history of  Difficulty breathing and pain in her right side chest reported by patient  Significant symptoms had onset one  week(s) ago.  /75  Temp 97  F (36.1  C) (Temporal)  Resp 18  Ht 1.499 m (4' 11\")  Wt 82.1 kg (181 lb)  LMP 03/06/2018  SpO2 96%  Breastfeeding? No  BMI 36.56 kg/m2t  Patient appears alert , in severe distress., and cooperative behavior.    Airway: intact  Breathing noted as Normal and Abnormal.  Circulation normal  Skinnormal  Action taken: to waiting room       PRE HOSPITAL PRIOR LIVING SITUATION Other Relatives  "

## 2018-03-26 NOTE — ED PROVIDER NOTES
History   No chief complaint on file.    Patient is a 42 year old female presenting with dysuria. The history is provided by the patient.   Dysuria   Pain quality:  Burning  Pain severity:  Mild  Progression:  Worsening  Chronicity:  New  Recent urinary tract infections: no    Relieved by:  Acetaminophen  Urinary symptoms: frequent urination    Urinary symptoms: no discolored urine, no foul-smelling urine, no hematuria, no hesitancy and no bladder incontinence    Associated symptoms: flank pain    Associated symptoms: no fever, no nausea, no vaginal discharge and no vomiting    Risk factors: no hx of pyelonephritis, no hx of urolithiasis, no kidney transplant, not pregnant, no renal cysts, not sexually active, no sexually transmitted infections, no single kidney and no urinary catheter      Liliana Yao is a 42 year old female who rt flank pain, no FCS.  Slight painful urination,  No NVD.    Problem List:    Patient Active Problem List    Diagnosis Date Noted     Anxiety state 02/08/2018     Priority: Medium     Overview:   with psychotic features       Major depressive disorder, recurrent episode, moderate (H) 02/08/2018     Priority: Medium     Overview:   Clinical       Diabetes mellitus, type II (H) 02/08/2018     Priority: Medium     Overview:   Noncompliant  a1c = 8.9 on 4/14/15.   Referred to eye doctor.  On aspirin.  On statin/ace        Hx of self mutilation 02/08/2018     Priority: Medium     Overview:   none in last 10 years       Morbid obesity (H) 02/08/2018     Priority: Medium     Overview:   Noncompliant       Vitamin D deficiency 02/08/2018     Priority: Medium     Goiter 12/31/2012     Priority: Medium     Low back pain syndrome 08/29/2011     Priority: Medium     Essential hypertension, benign 05/18/2011     Priority: Medium     Knee pain 02/15/2011     Priority: Medium     Overview:   left       Calcaneal spur, left 12/01/2010     Priority: Medium     Plantar fasciitis 09/28/2010      From: [facility]  To: Melvin Swann MD  Sent: 1/15/2018 8:12 AM CST  Subject: medication    Hello    I was wondering if Dr. Monika Juarez could refill my Fluoxetine 20mg. I was originally seeing a [address] [place] [address] for this but my insurance has now changed so I will be in the [facility] system. Please let me know if he is able to do this or if he needs to see me. Thanks!   Lima Inman Priority: Medium     Hearing loss 01/13/2010     Priority: Medium     Hypercholesterolemia 02/01/2007     Priority: Medium        Past Medical History:    Past Medical History:   Diagnosis Date     Allergic rhinitis      Coagulation defect (H)      Diarrhea      Encounter for other general examination (CODE)      Obesity      Otitis media      Personal history of other mental and behavioral disorders      Sebaceous cyst      Torticollis      Uncomplicated asthma        Past Surgical History:    Past Surgical History:   Procedure Laterality Date     EXCISE CYST GENERIC (LOCATION)      The patient had right ganglion cyst removal.     TYMPANOSTOMY, LOCAL/TOPICAL ANESTHESIA      PE tubes at age 4 & 24     TYMPANOSTOMY, LOCAL/TOPICAL ANESTHESIA      07/08/2008,Tube placement by Dr. Paco Izquierdo       Family History:    Family History   Problem Relation Age of Onset     Hypertension Mother      Hypertension,Hypertension.     Other - See Comments Mother       Chronic heart failure.  Dense fibrocystic breast disease.     Breast Cancer Paternal Grandmother      Cancer-breast,breast cancer, mastectomy     DIABETES Father      Diabetes,Multiple members on father's side with diabetes     Breast Cancer Paternal Aunt      Cancer-breast, bilateral mastectomy for breast cancer       Social History:  Marital Status:   [2]  Social History   Substance Use Topics     Smoking status: Former Smoker     Packs/day: 0.50     Years: 12.00     Types: Cigarettes     Quit date: 9/13/1997     Smokeless tobacco: Never Used     Alcohol use 0.6 oz/week      Comment: Alcoholic Drinks/day: 1 per month        Medications:      ciprofloxacin (CIPRO) 250 MG tablet   phenazopyridine (PYRIDIUM) 200 MG tablet   insulin detemir (LEVEMIR) 100 UNIT/ML injection   glucagon 1 MG kit   cyclobenzaprine (FLEXERIL) 10 MG tablet   blood glucose monitoring (ONETOUCH ULTRA) test strip   escitalopram (LEXAPRO) 20 MG tablet   levothyroxine (SYNTHROID/LEVOTHROID)  "75 MCG tablet   lisinopril (PRINIVIL/ZESTRIL) 10 MG tablet   LORazepam (ATIVAN) 1 MG tablet   metFORMIN (GLUCOPHAGE) 500 MG tablet   Insulin Pen Needle (PEN NEEDLES) 29G X 12MM MISC   simvastatin (ZOCOR) 20 MG tablet         Review of Systems   Constitutional: Negative for chills and fever.   HENT: Negative for congestion.    Respiratory: Negative for cough, choking, chest tightness and shortness of breath.    Cardiovascular: Negative for chest pain.   Gastrointestinal: Negative for nausea and vomiting.   Endocrine: Negative for polydipsia, polyphagia and polyuria.   Genitourinary: Positive for dysuria, flank pain and frequency. Negative for difficulty urinating, enuresis and vaginal discharge.   Neurological: Negative for light-headedness.   Hematological: Negative for adenopathy.       Physical Exam   BP: 123/75  Heart Rate: 101  Temp: 97  F (36.1  C)  Resp: 18  Height: 149.9 cm (4' 11\")  Weight: 82.1 kg (181 lb)  SpO2: 96 %      Physical Exam   HENT:   Mouth/Throat: No oropharyngeal exudate.   Cardiovascular: Normal rate.    No murmur heard.  Pulmonary/Chest: Effort normal and breath sounds normal. No respiratory distress. She has no wheezes. She has no rales.   Abdominal: Soft. There is CVA tenderness. There is no tenderness at McBurney's point and negative Obrien's sign.   Nursing note and vitals reviewed.  mild rt sided CVA ttp    ED Course     ED Course     Procedures      Results for orders placed or performed during the hospital encounter of 03/25/18 (from the past 24 hour(s))   *UA reflex to Microscopic   Result Value Ref Range    Color Urine Yellow     Appearance Urine Slightly Cloudy     Glucose Urine >1000 (A) NEG^Negative mg/dL    Bilirubin Urine Negative NEG^Negative    Ketones Urine Negative NEG^Negative mg/dL    Specific Gravity Urine 1.025 1.003 - 1.035    Blood Urine Negative NEG^Negative    pH Urine 5.0 5.0 - 7.0 pH    Protein Albumin Urine Trace (A) NEG^Negative mg/dL    Urobilinogen Urine 0.2 " 0.2 - 1.0 EU/dL    Nitrite Urine Negative NEG^Negative    Leukocyte Esterase Urine Negative NEG^Negative    Source Midstream Urine    Urine Microscopic   Result Value Ref Range    WBC Urine 10-25 (A) OTO5^0 - 5 /HPF    RBC Urine O - 2 OTO2^O - 2 /HPF    Squamous Epithelial /LPF Urine Many (A) FEW^Few /LPF    Bacteria Urine Many (A) NEG^Negative /HPF   CBC with platelets differential   Result Value Ref Range    WBC 9.7 4.0 - 11.0 10e9/L    RBC Count 4.53 3.8 - 5.2 10e12/L    Hemoglobin 13.5 11.7 - 15.7 g/dL    Hematocrit 38.3 35.0 - 47.0 %    MCV 85 78 - 100 fl    MCH 29.8 26.5 - 33.0 pg    MCHC 35.2 31.5 - 36.5 g/dL    RDW 13.0 10.0 - 15.0 %    Platelet Count 213 150 - 450 10e9/L    Diff Method Automated Method     % Neutrophils 68.9 %    % Lymphocytes 17.6 %    % Monocytes 7.8 %    % Eosinophils 5.0 %    % Basophils 0.4 %    % Immature Granulocytes 0.3 %    Absolute Neutrophil 6.7 1.6 - 8.3 10e9/L    Absolute Lymphocytes 1.7 0.8 - 5.3 10e9/L    Absolute Monocytes 0.8 0.0 - 1.3 10e9/L    Absolute Eosinophils 0.5 0.0 - 0.7 10e9/L    Absolute Basophils 0.0 0.0 - 0.2 10e9/L    Abs Immature Granulocytes 0.0 0 - 0.4 10e9/L   Comprehensive metabolic panel   Result Value Ref Range    Sodium 135 134 - 144 mmol/L    Potassium 4.6 3.5 - 5.1 mmol/L    Chloride 104 98 - 107 mmol/L    Carbon Dioxide 23 21 - 31 mmol/L    Anion Gap 8 3 - 14 mmol/L    Glucose 304 (H) 70 - 105 mg/dL    Urea Nitrogen 11 7 - 25 mg/dL    Creatinine 0.56 (L) 0.60 - 1.20 mg/dL    GFR Estimate >90 >60 mL/min/1.7m2    GFR Estimate If Black >90 >60 mL/min/1.7m2    Calcium 9.1 8.6 - 10.3 mg/dL    Bilirubin Total 0.9 0.3 - 1.0 mg/dL    Albumin 4.0 3.5 - 5.7 g/dL    Protein Total 8.0 6.4 - 8.9 g/dL    Alkaline Phosphatase 98 34 - 104 U/L    ALT 41 7 - 52 U/L    AST 21 13 - 39 U/L   Lactic acid   Result Value Ref Range    Lactic Acid 1.3 0.5 - 2.2 mmol/L       Medications   cefTRIAXone (ROCEPHIN) injection 1 g (not administered)   oxyCODONE-acetaminophen  (PERCOCET) 5-325 MG per tablet 1 tablet (1 tablet Oral Given 3/25/18 1940)       Assessments & Plan (with Medical Decision Making)       New Prescriptions    CIPROFLOXACIN (CIPRO) 250 MG TABLET    Take 2 tablets (500 mg) by mouth 2 times daily for 7 days    PHENAZOPYRIDINE (PYRIDIUM) 200 MG TABLET    Take 1 tablet (200 mg) by mouth 3 times daily as needed for irritation     Feeling better, no septic mild pyelo.  Cipro for home, culture pending.  RTED with temp >100.4,  Inc pain, or inability to pass urine.  No hematuria, and only mild pain so low clinical susp of occult stone.  If pain worsens consider CT.    Final diagnoses:   Pyelonephritis       3/25/2018   [facility]     Tab Kendrick MD  03/25/18 2036

## 2018-03-28 ENCOUNTER — TELEPHONE (OUTPATIENT)
Dept: EMERGENCY MEDICINE | Facility: OTHER | Age: 43
End: 2018-03-28

## 2018-03-28 LAB
BACTERIA SPEC CULT: NO GROWTH
SPECIMEN SOURCE: NORMAL

## 2018-03-28 NOTE — TELEPHONE ENCOUNTER
"[facility] Lab result notification:    Reason for call  Notify of lab results, assess symptoms,  review ED providers recommendations (if necessary) and advise per ED lab result f/u protocol.    Lab result  Final urine culture report shows \"NO GROWTH\" and is NEGATIVE.  [facility] ED discharge antibiotic: Ciprofloxacin (Cipro) 250 mg tablet, 2 tablet (500 mg) by mouth 2 times daily for 7 days.  Is ED discharge Rx antibiotic for UTI only (Yes/No): Yes, pyelonephritis  Recommendations per [facility] ED Lab result protocol - Urine culture protocol.    Left voicemail message requesting a call back to [phone] between 10 a.m. and 6:30 p.m. for patient's ED/UC lab results.    Angie Stovall RN    [facility] RN  Lung Nodule and ED Lab Results F/U RN  Epic pool (ED late result f/u RN) : [id number]   # [phone]    "

## 2018-04-17 ENCOUNTER — OFFICE VISIT (OUTPATIENT)
Dept: OTOLARYNGOLOGY | Facility: OTHER | Age: 43
End: 2018-04-17
Attending: OTOLARYNGOLOGY
Payer: MEDICARE

## 2018-04-17 DIAGNOSIS — H92.12 OTORRHEA, LEFT: Primary | ICD-10-CM

## 2018-04-17 NOTE — MR AVS SNAPSHOT
"              After Visit Summary   2018    Liliana Yao    MRN: [id number]           Patient Information     Date Of Birth          1975        Visit Information        Provider Department      2018 10:50 AM Paco Izquierdo MD [facility]        Today's Diagnoses     Otorrhea, left    -  1       Follow-ups after your visit        Who to contact     If you have questions or need follow up information about today's clinic visit or your schedule please contact [facility] directly at [phone].  Normal or non-critical lab and imaging results will be communicated to you by Bon-PrivÃƒÂ©hart, letter or phone within 4 business days after the clinic has received the results. If you do not hear from us within 7 days, please contact the clinic through [employer] or phone. If you have a critical or abnormal lab result, we will notify you by phone as soon as possible.  Submit refill requests through Revision3 or call your pharmacy and they will forward the refill request to us. Please allow 3 business days for your refill to be completed.          Additional Information About Your Visit        MyChart Information     Revision3 lets you send messages to your doctor, view your test results, renew your prescriptions, schedule appointments and more. To sign up, go to [email or web address] . Click on \"Log in\" on the left side of the screen, which will take you to the Welcome page. Then click on \"Sign up Now\" on the right side of the page.     You will be asked to enter the access code listed below, as well as some personal information. Please follow the directions to create your username and password.     Your access code is: [email or web address]  Expires: 2018 11:48 PM     Your access code will  in 90 days. If you need help or a new code, please call your [place] clinic or [phone].        Care EveryWhere ID     This is your Care EveryWhere ID. This could be used by other " organizations to access your [place] medical records  [id number]         Blood Pressure from Last 3 Encounters:   03/25/18 107/69   02/27/18 (!) 144/95   01/02/18 133/88    Weight from Last 3 Encounters:   03/25/18 82.1 kg (181 lb)   02/27/18 84.4 kg (186 lb)   01/02/18 85.7 kg (189 lb)              Today, you had the following     No orders found for display       Primary Care Provider Office Phone # Fax #    Khushbu Mccormick, TA[place] [phone]       [facility] [address]  [facility] [address]        Equal Access to Services     KAJAL ANGEL : Hadii roscoe Oro, stuart lam, julio kaalmada manjit, cj duke . So [facility] [phone].    ATENCIÓN: Si habla español, tiene a thapa disposición servicios gratuitos de asistencia lingüística. Llame al [phone].    We comply with applicable federal civil rights laws and [place] laws. We do not discriminate on the basis of race, color, national origin, age, disability, sex, sexual orientation, or gender identity.            Thank you!     Thank you for choosing [facility] AND [facility]  for your care. Our goal is always to provide you with excellent care. Hearing back from our patients is one way we can continue to improve our services. Please take a few minutes to complete the written survey that you may receive in the mail after your visit with us. Thank you!             Your Updated Medication List - Protect others around you: Learn how to safely use, store and throw away your medicines at www.disposemymeds.org.          This list is accurate as of 4/17/18 11:59 PM.  Always use your most recent med list.                   Brand Name Dispense Instructions for use Diagnosis    cyclobenzaprine 10 MG tablet    FLEXERIL    30 tablet    Take 1 tablet (10 mg) by mouth 3 times daily as needed for muscle spasms        escitalopram 20 MG tablet    LEXAPRO     Take 20 mg by mouth daily         glucagon 1 MG kit      Inject 1 mg into the muscle        insulin detemir 100 UNIT/ML injection    LEVEMIR     65 units every day.        levothyroxine 75 MCG tablet    SYNTHROID/LEVOTHROID     Take 75 mcg by mouth daily        lisinopril 10 MG tablet    PRINIVIL/ZESTRIL     Take 10 mg by mouth daily        LORazepam 1 MG tablet    ATIVAN     Take 1 mg by mouth 3 times daily        metFORMIN 500 MG tablet    GLUCOPHAGE     Take 1,000 mg by mouth 2 times daily (with meals)        ONETOUCH ULTRA test strip   Generic drug:  blood glucose monitoring      As directed. Dispense test strips covered by the patient insurance. Test 3-4 times per day.        Pen Needles 29G X 12MM Misc      As directed. 31 G x 6 MM . Use with Victoza to inject subcu once daily        phenazopyridine 200 MG tablet    PYRIDIUM    6 tablet    Take 1 tablet (200 mg) by mouth 3 times daily as needed for irritation        simvastatin 20 MG tablet    ZOCOR     Take 20 mg by mouth daily

## 2018-04-20 NOTE — PROGRESS NOTES
REOL RONAN MOSQUERA    42 Y old Female, : 1975    Account Number: [id number]    [address], [place]    Home: [phone]     Guarantor: RONAN SEVILLA Insurance: [facility] MEDICARE B Payer ID: [id number]   PCP: Khushbu Mccormick CNP    Appointment Facility: [facility]      2018 Paco Izquierdo MD       Current Medications Reason for Appointment     1. RECURRENT EAR INFECTION LEFT EAR/PERF EARDRUM     2. Chronic drainage left ear     History of Present Illness     HPI:   The patient is a 42-year-old female whose had ear problems a lot of her adult life. She had had tubes as an adult. She apparently had a T-tube removed on the left a year ago. She does not believe the eardrum ever sealed. She has been having purulent and bloody drainage from the ear for the last 4 months. She has failed to respond to both antibiotics and drops given to her by her primary doctor.     Examination     General Examination:  External auditory canals are clear. On the right eardrum is intact. On the left the anterior inferior tympanic membrane appears to be filled with purulence and polypoid material. There is some squamous debris present.   Remainder of the head neck exam is unremarkable.       Assessments     1. Otorrhea, left ear - H92.12 (Primary)     Treatment     1. Otorrhea, left ear   Start Ofloxacin Solution, 0.3 %, 3 drops left ear, Otic, every 12 hrs, 10 days, 7.5 ml, Refills 0  Start Cefprozil Tablet, 500 MG, 1 tablet, Orally, every 12 hrs, 10 day(s), 20 Tablet, Refills 0       2. Others   Notes: We will start her on some drops and antibiotics and see her back in a couple of weeks. If her unsuccessful in cleaning the ear up we will proceed to a CT scan to assess for possible cholesteatoma.  Procedures  [ ].                Follow Up     3 Weeks              None          Past Medical History Electronically signed by PACO IZQUIERDO MD on 2018 at 01:08 PM CDT    Depression  .       Diabetes.       Dizziness.       Ear pressure.       Hearing loss.       Thyroid disorder.       Tinnitus.       Social History Sign off status: Completed    Tobacco Use:   Smoking   History: former smoker  Second Hand Smoking Exposure   : Yes     Allergies     Sulindac     SULFA ALLERGY     LATEX     Review of Systems     [facility]  [address]  [place] [address]  Tel: [phone]  Fax:       [ ].           Patient: RONAN SEVILLA : 1975 Progress Note: Paco Izquierdo MD 2018        Note generated by real trends EMR/PM Software ([email or web address])    true

## 2018-05-08 ENCOUNTER — OFFICE VISIT (OUTPATIENT)
Dept: OTOLARYNGOLOGY | Facility: OTHER | Age: 43
End: 2018-05-08
Attending: OTOLARYNGOLOGY
Payer: MEDICARE

## 2018-05-08 DIAGNOSIS — H92.12 CHRONIC OTORRHEA OF LEFT EAR: Primary | ICD-10-CM

## 2018-05-08 PROCEDURE — G0463 HOSPITAL OUTPT CLINIC VISIT: HCPCS

## 2018-05-08 NOTE — NURSING NOTE
Patient is here today for a F/U recheck left ear after meds.Steffi Prado LPN......................5/8/2018 12:02 PM

## 2018-05-08 NOTE — MR AVS SNAPSHOT
"              After Visit Summary   2018    Liliana Yao    MRN: [id number]           Patient Information     Date Of Birth          1975        Visit Information        Provider Department      2018 12:20 PM Paco Izquierdo MD [facility]        Today's Diagnoses     Chronic otorrhea of left ear    -  1       Follow-ups after your visit        Who to contact     If you have questions or need follow up information about today's clinic visit or your schedule please contact [facility] directly at [phone].  Normal or non-critical lab and imaging results will be communicated to you by Tungle.mehart, letter or phone within 4 business days after the clinic has received the results. If you do not hear from us within 7 days, please contact the clinic through [employer] or phone. If you have a critical or abnormal lab result, we will notify you by phone as soon as possible.  Submit refill requests through Siemens or call your pharmacy and they will forward the refill request to us. Please allow 3 business days for your refill to be completed.          Additional Information About Your Visit        MyChart Information     Siemens lets you send messages to your doctor, view your test results, renew your prescriptions, schedule appointments and more. To sign up, go to [email or web address] . Click on \"Log in\" on the left side of the screen, which will take you to the Welcome page. Then click on \"Sign up Now\" on the right side of the page.     You will be asked to enter the access code listed below, as well as some personal information. Please follow the directions to create your username and password.     Your access code is: [email or web address]  Expires: 2018 11:48 PM     Your access code will  in 90 days. If you need help or a new code, please call your [facility] clinic or [phone].        Care EveryWhere ID     This is your Care EveryWhere ID. This could be " used by other organizations to access your [place] medical records  [id number]         Blood Pressure from Last 3 Encounters:   03/25/18 107/69   02/27/18 (!) 144/95   01/02/18 133/88    Weight from Last 3 Encounters:   03/25/18 82.1 kg (181 lb)   02/27/18 84.4 kg (186 lb)   01/02/18 85.7 kg (189 lb)              Today, you had the following     No orders found for display       Primary Care Provider Office Phone # Fax #    Khushbu Mccormick, [phone]       [facility] [address] [facility] [address]        Equal Access to Services     KAJAL ANGEL : Parveen Oro, stuart lam, julio saravia, cj dennis. So [facility] [phone].    ATENCIÓN: Si habla español, tiene a thapa disposición servicios gratuitos de asistencia lingüística. Llame al [phone].    We comply with applicable federal civil rights laws and [place] laws. We do not discriminate on the basis of race, color, national origin, age, disability, sex, sexual orientation, or gender identity.            Thank you!     Thank you for choosing [facility] AND [facility]  for your care. Our goal is always to provide you with excellent care. Hearing back from our patients is one way we can continue to improve our services. Please take a few minutes to complete the written survey that you may receive in the mail after your visit with us. Thank you!             Your Updated Medication List - Protect others around you: Learn how to safely use, store and throw away your medicines at www.disposemymeds.org.          This list is accurate as of 5/8/18 11:59 PM.  Always use your most recent med list.                   Brand Name Dispense Instructions for use Diagnosis    cyclobenzaprine 10 MG tablet    FLEXERIL    30 tablet    Take 1 tablet (10 mg) by mouth 3 times daily as needed for muscle spasms        escitalopram 20 MG tablet    LEXAPRO     Take 20 mg by mouth  daily        glucagon 1 MG kit      Inject 1 mg into the muscle        insulin detemir 100 UNIT/ML injection    LEVEMIR     65 units every day.        levothyroxine 75 MCG tablet    SYNTHROID/LEVOTHROID     Take 75 mcg by mouth daily        lisinopril 10 MG tablet    PRINIVIL/ZESTRIL     Take 10 mg by mouth daily        LORazepam 1 MG tablet    ATIVAN     Take 1 mg by mouth 3 times daily        metFORMIN 500 MG tablet    GLUCOPHAGE     Take 1,000 mg by mouth 2 times daily (with meals)        ONETOUCH ULTRA test strip   Generic drug:  blood glucose monitoring      As directed. Dispense test strips covered by the patient insurance. Test 3-4 times per day.        Pen Needles 29G X 12MM Misc      As directed. 31 G x 6 MM . Use with Victoza to inject subcu once daily        simvastatin 20 MG tablet    ZOCOR     Take 20 mg by mouth daily

## 2018-05-23 NOTE — PROGRESS NOTES
RONAN SEVILLA    42 Y old Female, : 1975    Account Number: [id number]    [address], [place], [place]    Home: [phone]     Guarantor: RONAN SEVILLA Insurance: [facility] MEDICARE B Payer ID: [id number]   PCP: Khushbu Mccormick CNP    Appointment Facility: [facility]      2018 Progress Notes: Paco Izquierdo MD        Reason for Appointment     1. RECHECK LEFT EAR AFTER MEDS     2. Persistent drainage left ear     History of Present Illness     HPI:   Patient is a 42-year-old female whose had chronic ear difficulties in the past. She has had tubes previously. She has seen recently with inflammatory polyp formation and drainage on the left. She returns today for follow-up. She has finished her med but still continues to have some low-grade drainage from the left ear.     Examination     General Examination:  External auditory canal demonstrates some purulent drainage layering out inferiorly on the left. There is inflammatory polyp tissue remaining. I cannot tell if there is a perforation or eardrum is very retracted. There is inflammatory tissue posteriorly and superiorly in the canal as well. There is no obvious squamous debris.   On the right, there is a small inflammatory polyp posteriorly on the medial canal. The eardrum appears to be intact.   The remainder of the head neck exam is unremarkable.       Assessments     1. Otorrhea of left ear - H92.12 (Primary)     Treatment     1. Otorrhea of left ear   Start Ciprodex Suspension, 0.3-0.1 %, 3 drops into left ear, Otic, Twice a day, 10 days, 10 ml, Refills 0       2. Others   Notes: We will start her on some Ciprodex drops to the left ear. I would like to see her in 3-4 weeks in the loose for microscope exam of the ear and possible audio testing.  Procedures  [ ].                Follow Up     4 Weeks                         Electronically signed by PACO IZQUIERDO MD on 2018 at 12:22 PM CDT    Past Medical  History Sign off status: Completed    Depression .       Diabetes.       Dizziness.       Ear pressure.       Hearing loss.       Thyroid disorder.       Tinnitus.       Allergies     Sulindac     SULFA ALLERGY     LATEX     Review of Systems     [facility]  [address] [place] [address]  Tel: [phone]  Fax:       [ ].           Patient: RONAN SEVILLA : 1975 Progress Note: Paco Izquierdo MD 2018        Note generated by [employer] EMR/PM Software ([email or web address])    true

## 2018-08-15 ENCOUNTER — HOSPITAL ENCOUNTER (OUTPATIENT)
Dept: CT IMAGING | Facility: OTHER | Age: 43
Discharge: HOME | End: 2018-08-15
Attending: OTOLARYNGOLOGY | Admitting: OTOLARYNGOLOGY
Payer: MEDICARE

## 2018-08-15 DIAGNOSIS — H92.12 OTORRHEA OF LEFT EAR: ICD-10-CM

## 2018-08-15 PROCEDURE — 70480 CT ORBIT/EAR/FOSSA W/O DYE: CPT

## 2018-09-25 ENCOUNTER — OFFICE VISIT (OUTPATIENT)
Dept: OTOLARYNGOLOGY | Facility: OTHER | Age: 43
End: 2018-09-25
Attending: OTOLARYNGOLOGY
Payer: MEDICARE

## 2018-09-25 DIAGNOSIS — Z09 POSTOP CHECK: Primary | ICD-10-CM

## 2018-09-25 PROCEDURE — G0463 HOSPITAL OUTPT CLINIC VISIT: HCPCS

## 2018-09-25 RX ORDER — LIRAGLUTIDE 6 MG/ML
1.2 INJECTION SUBCUTANEOUS DAILY
COMMUNITY
Start: 2018-08-28 | End: 2019-12-07

## 2018-09-25 NOTE — MR AVS SNAPSHOT
After Visit Summary   9/25/2018    Liliana Yao    MRN: [id number]           Patient Information     Date Of Birth          1975        Visit Information        Provider Department      9/25/2018 10:15 AM Paco Izquierdo MD [facility]        Today's Diagnoses     Postop check    -  1       Follow-ups after your visit        Who to contact     If you have questions or need follow up information about today's clinic visit or your schedule please contact [facility] directly at [phone].  Normal or non-critical lab and imaging results will be communicated to you by MyChart, letter or phone within 4 business days after the clinic has received the results. If you do not hear from us within 7 days, please contact the clinic through MyChart or phone. If you have a critical or abnormal lab result, we will notify you by phone as soon as possible.  Submit refill requests through [employer] or call your pharmacy and they will forward the refill request to us. Please allow 3 business days for your refill to be completed.          Additional Information About Your Visit        Care EveryWhere ID     This is your Care EveryWhere ID. This could be used by other organizations to access your [place] medical records  [id number]         Blood Pressure from Last 3 Encounters:   03/25/18 107/69   02/27/18 (!) 144/95   01/02/18 133/88    Weight from Last 3 Encounters:   03/25/18 82.1 kg (181 lb)   02/27/18 84.4 kg (186 lb)   01/02/18 85.7 kg (189 lb)              Today, you had the following     No orders found for display         Today's Medication Changes          These changes are accurate as of 9/25/18 11:59 PM.  If you have any questions, ask your nurse or doctor.               These medicines have changed or have updated prescriptions.        Dose/Directions    insulin detemir 100 UNIT/ML injection   Commonly known as:  LEVEMIR   This may have changed:  See the  new instructions.        Take 35 units subcutaneous in the morning and 35 units subcutaneous in the evening   Refills:  0                Primary Care Provider Office Phone # Fax #    Khushbu Mccormick [phone]        [address]   [place] [address]        Equal Access to Services     JOSE M ANGEL : Parveen zimmerman Somileali, waaxda luqadaha, qaybta kaalmada adechilo, cj dennis. So [facility] [phone].    ATENCIÓN: Si habla español, tiene a thapa disposición servicios gratuitos de asistencia lingüística. Llame al [phone].    We comply with applicable federal civil rights laws and [place] laws. We do not discriminate on the basis of race, color, national origin, age, disability, sex, sexual orientation, or gender identity.            Thank you!     Thank you for choosing [facility]  for your care. Our goal is always to provide you with excellent care. Hearing back from our patients is one way we can continue to improve our services. Please take a few minutes to complete the written survey that you may receive in the mail after your visit with us. Thank you!             Your Updated Medication List - Protect others around you: Learn how to safely use, store and throw away your medicines at www.disposemymeds.org.          This list is accurate as of 9/25/18 11:59 PM.  Always use your most recent med list.                   Brand Name Dispense Instructions for use Diagnosis    escitalopram 20 MG tablet    LEXAPRO     Take 20 mg by mouth daily        glucagon 1 MG kit      Inject 1 mg into the muscle        insulin detemir 100 UNIT/ML injection    LEVEMIR     Take 35 units subcutaneous in the morning and 35 units subcutaneous in the evening        levothyroxine 75 MCG tablet    SYNTHROID/LEVOTHROID     Take 75 mcg by mouth daily        liraglutide 18 MG/3ML soln    VICTOZA     Inject 1.2 mg Subcutaneous daily         lisinopril 10 MG tablet    PRINIVIL/ZESTRIL     Take 10 mg by mouth daily        LORazepam 1 MG tablet    ATIVAN     Take 1 mg by mouth 3 times daily        metFORMIN 500 MG tablet    GLUCOPHAGE     Take 1,000 mg by mouth 2 times daily (with meals)        ONETOUCH ULTRA test strip   Generic drug:  blood glucose monitoring      As directed. Dispense test strips covered by the patient insurance. Test 3-4 times per day.        Pen Needles 29G X 12MM Misc      As directed. 31 G x 6 MM . Use with Victoza to inject subcu once daily        simvastatin 20 MG tablet    ZOCOR     Take 20 mg by mouth daily

## 2018-10-16 ENCOUNTER — OFFICE VISIT (OUTPATIENT)
Dept: OTOLARYNGOLOGY | Facility: OTHER | Age: 43
End: 2018-10-16
Attending: OTOLARYNGOLOGY
Payer: MEDICARE

## 2018-10-16 DIAGNOSIS — Z09 POSTOP CHECK: Primary | ICD-10-CM

## 2018-10-16 PROCEDURE — G0463 HOSPITAL OUTPT CLINIC VISIT: HCPCS

## 2018-10-16 NOTE — MR AVS SNAPSHOT
After Visit Summary   10/16/2018    Liliana Yao    MRN: [id number]           Patient Information     Date Of Birth          1975        Visit Information        Provider Department      10/16/2018 12:45 PM Paco Izquierdo MD [facility]        Today's Diagnoses     Postop check    -  1       Follow-ups after your visit        Who to contact     If you have questions or need follow up information about today's clinic visit or your schedule please contact [facility] [place] directly at [phone].  Normal or non-critical lab and imaging results will be communicated to you by MyChart, letter or phone within 4 business days after the clinic has received the results. If you do not hear from us within 7 days, please contact the clinic through MyChart or phone. If you have a critical or abnormal lab result, we will notify you by phone as soon as possible.  Submit refill requests through [employer] or call your pharmacy and they will forward the refill request to us. Please allow 3 business days for your refill to be completed.          Additional Information About Your Visit        Care EveryWhere ID     This is your Care EveryWhere ID. This could be used by other organizations to access your [place] medical records  [id number]         Blood Pressure from Last 3 Encounters:   03/25/18 107/69   02/27/18 (!) 144/95   01/02/18 133/88    Weight from Last 3 Encounters:   03/25/18 82.1 kg (181 lb)   02/27/18 84.4 kg (186 lb)   01/02/18 85.7 kg (189 lb)              Today, you had the following     No orders found for display       Primary Care Provider Office Phone # Fax #    Khushbu Mccormick [phone]       [facility] [address]  [facility] [address]        Equal Access to Services     [facility] JEANNE : Parveen Oro, stuart lam, cj noriega. So [facility]  [phone].    ATENCIÓN: Si nelly ham, tiene a thapa disposición servicios gratuitos de asistencia lingüística. Manas oconnor [phone].    We comply with applicable federal civil rights laws and [place] laws. We do not discriminate on the basis of race, color, national origin, age, disability, sex, sexual orientation, or gender identity.            Thank you!     Thank you for choosing [facility]  for your care. Our goal is always to provide you with excellent care. Hearing back from our patients is one way we can continue to improve our services. Please take a few minutes to complete the written survey that you may receive in the mail after your visit with us. Thank you!             Your Updated Medication List - Protect others around you: Learn how to safely use, store and throw away your medicines at www.disposemymeds.org.          This list is accurate as of 10/16/18 11:59 PM.  Always use your most recent med list.                   Brand Name Dispense Instructions for use Diagnosis    escitalopram 20 MG tablet    LEXAPRO     Take 20 mg by mouth daily        glucagon 1 MG kit      Inject 1 mg into the muscle        insulin detemir 100 UNIT/ML injection    LEVEMIR     Take 35 units subcutaneous in the morning and 35 units subcutaneous in the evening        levothyroxine 75 MCG tablet    SYNTHROID/LEVOTHROID     Take 75 mcg by mouth daily        liraglutide 18 MG/3ML soln    VICTOZA     Inject 1.2 mg Subcutaneous daily        lisinopril 10 MG tablet    PRINIVIL/ZESTRIL     Take 10 mg by mouth daily        LORazepam 1 MG tablet    ATIVAN     Take 1 mg by mouth 3 times daily        metFORMIN 500 MG tablet    GLUCOPHAGE     Take 1,000 mg by mouth 2 times daily (with meals)        ONETOUCH ULTRA test strip   Generic drug:  blood glucose monitoring      As directed. Dispense test strips covered by the patient insurance. Test 3-4 times per day.        Pen Needles 29G X 12MM Misc      As  directed. 31 G x 6 MM . Use with Victoza to inject subcu once daily        simvastatin 20 MG tablet    ZOCOR     Take 20 mg by mouth daily

## 2018-10-16 NOTE — PROGRESS NOTES
RONAN SEVILLA    43 Y old Female, : 1975    Account Number: [id number]    [address], [place]    Home: [phone]     Guarantor: MANJEETTHOMASDAVIN RONAN DEMETRI Insurance: [facility] MEDICARE B Payer ID: [id number]   PCP: Khushbu Mccormick CNP    Appointment Facility: [facility]      2018 Progress Notes: Paco Izquierdo MD        Reason for Appointment     1. 1ST PO TYMPANOPLASTY AND ATTICOTOMY     2. Postop check     History of Present Illness     HPI:   The patient is a 43-year-old female who recently underwent atticotomy for chronic otorrhea. She did appear to have some attic block with granulation tissue. There is no cholesteatoma noted. She has had some persisting discomfort and drainage since surgery.     Examination     General Examination:  On the left, the operated, there is some purulent drainage medially near the drum. There is some swelling superiorly.       Assessments     1. Postop check - Z09 (Primary)     Treatment     1. Postop check   Start Ofloxacin Solution, 0.3 %, 3 drops left ear, Otic, every 12 hrs, 10 days, 7.5 ml, Refills 0       2. Others   Notes: We will start her on some Floxin drops and check her again in 2 weeks.  Procedures  [ ].                                   Past Medical History Electronically signed by PACO IZQUIERDO MD on 2018 at 08:53 AM CDT    Depression .       Diabetes.       Dizziness.       Ear pressure.       Hearing loss.       Thyroid disorder.       Tinnitus.       Family History Sign off status: Completed    Father: alive     Mother: alive     Allergies     Sulindac: Allergy     SULFA ALLERGY: Allergy     LATEX: Allergy     hazelnut: Allergy     Review of Systems     [facility]  [address] [place] [address]  Tel: [phone]  Fax:       [ ].           Patient: RONAN SEVILLA : 1975 Progress Note: Paco Izquierdo MD 2018        Note generated by [employer] EMR/PM  Software ([email or web address])

## 2018-10-23 NOTE — PROGRESS NOTES
RONAN SEVILLA    43 Y old Female, : 1975    Account Number: [id number]    [address], [place]    Home: [phone]     Guarantor: ROELRONAN Insurance: [facility] MEDICARE B Payer ID: [id number]   PCP: Khushbu Mccormick CNP    Appointment Facility: [facility]      10/16/2018 Progress Notes: Paco Izquierdo MD        Reason for Appointment     1. 2 WEEK FOLLOW UP AFTER MEDS     2. Postop check     History of Present Illness     HPI:   The patient is a 43-year-old female who recently underwent atticotomy and tympanoplasty with tube placement on the left for attic block and chronic otorrhea. There was no cholesteatoma identified. When last seen 2 weeks ago she had a lot of canal swelling with pain and drainage. She was placed on drops and antibiotics and asked to follow up at this time. She has much less discomfort and little if any drainage.     Examination     General Examination:  External auditory canal and TM are clear on the right. On the left, the tube is in place and patent. There is no active drainage. There is no persisting granulation tissue at this time. The eardrum appears thickened.   The remainder of the head neck exam is unremarkable.       Assessments     1. Postop check - Z09 (Primary)     Treatment     1. Others   Notes: She will check in with me in a month to make sure there is no residual inflammation.  Procedures  [ ].                Follow Up     4 Weeks                        Past Medical History Electronically signed by PACO IZQUIERDO MD on 10/18/2018 at 01:56 PM CDT    Depression .       Diabetes.       Dizziness.       Ear pressure.       Hearing loss.       Thyroid disorder.       Tinnitus.       Family History Sign off status: Completed    Father: alive     Mother: alive     Allergies     Sulindac: Allergy     SULFA ALLERGY: Allergy     LATEX: Allergy     hazelnut: Allergy     Review of Systems     [facility]  [address]  [place] [address]  Tel: [phone]  Fax:       [ ].           Patient: RONAN SEVILLA : 1975 Progress Note: Paco Izquierdo MD 10/16/2018        Note generated by [employer] EMR/PM Software ([email or web address])

## 2018-11-20 ENCOUNTER — OFFICE VISIT (OUTPATIENT)
Dept: OTOLARYNGOLOGY | Facility: OTHER | Age: 43
End: 2018-11-20
Attending: OTOLARYNGOLOGY
Payer: MEDICARE

## 2018-11-20 DIAGNOSIS — Z09 POSTOP CHECK: Primary | ICD-10-CM

## 2018-11-20 PROCEDURE — G0463 HOSPITAL OUTPT CLINIC VISIT: HCPCS

## 2018-11-27 ENCOUNTER — APPOINTMENT (OUTPATIENT)
Dept: GENERAL RADIOLOGY | Facility: OTHER | Age: 43
End: 2018-11-27
Attending: PHYSICIAN ASSISTANT
Payer: MEDICARE

## 2018-11-27 ENCOUNTER — HOSPITAL ENCOUNTER (EMERGENCY)
Facility: OTHER | Age: 43
Discharge: HOME | End: 2018-11-27
Attending: PHYSICIAN ASSISTANT | Admitting: PHYSICIAN ASSISTANT
Payer: MEDICARE

## 2018-11-27 VITALS
HEIGHT: 59 IN | BODY MASS INDEX: 37.09 KG/M2 | WEIGHT: 184 LBS | OXYGEN SATURATION: 93 % | RESPIRATION RATE: 25 BRPM | DIASTOLIC BLOOD PRESSURE: 100 MMHG | SYSTOLIC BLOOD PRESSURE: 136 MMHG | TEMPERATURE: 97.7 F

## 2018-11-27 DIAGNOSIS — R07.89 ATYPICAL CHEST PAIN: ICD-10-CM

## 2018-11-27 LAB
ALBUMIN UR-MCNC: ABNORMAL MG/DL
ANION GAP SERPL CALCULATED.3IONS-SCNC: 9 MMOL/L (ref 3–14)
APPEARANCE UR: CLEAR
BASOPHILS # BLD AUTO: 0.1 10E9/L (ref 0–0.2)
BASOPHILS NFR BLD AUTO: 0.8 %
BILIRUB UR QL STRIP: NEGATIVE
BUN SERPL-MCNC: 12 MG/DL (ref 7–25)
CALCIUM SERPL-MCNC: 9.4 MG/DL (ref 8.6–10.3)
CHLORIDE SERPL-SCNC: 101 MMOL/L (ref 98–107)
CO2 SERPL-SCNC: 26 MMOL/L (ref 21–31)
COLOR UR AUTO: YELLOW
CREAT SERPL-MCNC: 0.53 MG/DL (ref 0.6–1.2)
DIFFERENTIAL METHOD BLD: NORMAL
EOSINOPHIL # BLD AUTO: 0.4 10E9/L (ref 0–0.7)
EOSINOPHIL NFR BLD AUTO: 6.1 %
ERYTHROCYTE [DISTWIDTH] IN BLOOD BY AUTOMATED COUNT: 12.5 % (ref 10–15)
GFR SERPL CREATININE-BSD FRML MDRD: >90 ML/MIN/1.7M2
GLUCOSE SERPL-MCNC: 207 MG/DL (ref 70–105)
GLUCOSE UR STRIP-MCNC: NEGATIVE MG/DL
HCG UR QL: NEGATIVE
HCT VFR BLD AUTO: 40.3 % (ref 35–47)
HGB BLD-MCNC: 13.5 G/DL (ref 11.7–15.7)
HGB UR QL STRIP: NEGATIVE
IMM GRANULOCYTES # BLD: 0 10E9/L (ref 0–0.4)
IMM GRANULOCYTES NFR BLD: 0.5 %
KETONES UR STRIP-MCNC: NEGATIVE MG/DL
LEUKOCYTE ESTERASE UR QL STRIP: NEGATIVE
LYMPHOCYTES # BLD AUTO: 2.3 10E9/L (ref 0.8–5.3)
LYMPHOCYTES NFR BLD AUTO: 34.6 %
MCH RBC QN AUTO: 29.5 PG (ref 26.5–33)
MCHC RBC AUTO-ENTMCNC: 33.5 G/DL (ref 31.5–36.5)
MCV RBC AUTO: 88 FL (ref 78–100)
MONOCYTES # BLD AUTO: 0.5 10E9/L (ref 0–1.3)
MONOCYTES NFR BLD AUTO: 8 %
NEUTROPHILS # BLD AUTO: 3.3 10E9/L (ref 1.6–8.3)
NEUTROPHILS NFR BLD AUTO: 50 %
NITRATE UR QL: NEGATIVE
PH UR STRIP: 5 PH (ref 5–9)
PLATELET # BLD AUTO: 212 10E9/L (ref 150–450)
POTASSIUM SERPL-SCNC: 4.1 MMOL/L (ref 3.5–5.1)
RBC # BLD AUTO: 4.58 10E12/L (ref 3.8–5.2)
SODIUM SERPL-SCNC: 136 MMOL/L (ref 134–144)
SOURCE: ABNORMAL
SP GR UR STRIP: 1.02 (ref 1–1.03)
TROPONIN I SERPL-MCNC: <0.03 UG/L (ref 0–0.03)
TROPONIN I SERPL-MCNC: <0.03 UG/L (ref 0–0.03)
TSH SERPL DL<=0.05 MIU/L-ACNC: 4.21 IU/ML (ref 0.34–5.6)
UROBILINOGEN UR STRIP-ACNC: 0.2 EU/DL (ref 0.2–1)
WBC # BLD AUTO: 6.5 10E9/L (ref 4–11)

## 2018-11-27 PROCEDURE — A9270 NON-COVERED ITEM OR SERVICE: HCPCS | Performed by: PHYSICIAN ASSISTANT

## 2018-11-27 PROCEDURE — 80048 BASIC METABOLIC PNL TOTAL CA: CPT | Performed by: PHYSICIAN ASSISTANT

## 2018-11-27 PROCEDURE — 96360 HYDRATION IV INFUSION INIT: CPT | Performed by: PHYSICIAN ASSISTANT

## 2018-11-27 PROCEDURE — 85025 COMPLETE CBC W/AUTO DIFF WBC: CPT | Performed by: PHYSICIAN ASSISTANT

## 2018-11-27 PROCEDURE — 81025 URINE PREGNANCY TEST: CPT | Performed by: PHYSICIAN ASSISTANT

## 2018-11-27 PROCEDURE — 99283 EMERGENCY DEPT VISIT LOW MDM: CPT | Mod: Z6 | Performed by: PHYSICIAN ASSISTANT

## 2018-11-27 PROCEDURE — 25000125 ZZHC RX 250: Performed by: PHYSICIAN ASSISTANT

## 2018-11-27 PROCEDURE — 84443 ASSAY THYROID STIM HORMONE: CPT | Performed by: PHYSICIAN ASSISTANT

## 2018-11-27 PROCEDURE — 71046 X-RAY EXAM CHEST 2 VIEWS: CPT

## 2018-11-27 PROCEDURE — 93005 ELECTROCARDIOGRAM TRACING: CPT | Performed by: PHYSICIAN ASSISTANT

## 2018-11-27 PROCEDURE — 93010 ELECTROCARDIOGRAM REPORT: CPT | Performed by: INTERNAL MEDICINE

## 2018-11-27 PROCEDURE — 99285 EMERGENCY DEPT VISIT HI MDM: CPT | Mod: 25 | Performed by: PHYSICIAN ASSISTANT

## 2018-11-27 PROCEDURE — 25000128 H RX IP 250 OP 636: Performed by: PHYSICIAN ASSISTANT

## 2018-11-27 PROCEDURE — 84484 ASSAY OF TROPONIN QUANT: CPT | Mod: 91 | Performed by: PHYSICIAN ASSISTANT

## 2018-11-27 PROCEDURE — 25000132 ZZH RX MED GY IP 250 OP 250 PS 637: Performed by: PHYSICIAN ASSISTANT

## 2018-11-27 PROCEDURE — 81003 URINALYSIS AUTO W/O SCOPE: CPT | Performed by: PHYSICIAN ASSISTANT

## 2018-11-27 PROCEDURE — 36415 COLL VENOUS BLD VENIPUNCTURE: CPT | Performed by: PHYSICIAN ASSISTANT

## 2018-11-27 PROCEDURE — 84484 ASSAY OF TROPONIN QUANT: CPT | Performed by: PHYSICIAN ASSISTANT

## 2018-11-27 PROCEDURE — 96361 HYDRATE IV INFUSION ADD-ON: CPT | Performed by: PHYSICIAN ASSISTANT

## 2018-11-27 RX ORDER — ALUMINA, MAGNESIA, AND SIMETHICONE 2400; 2400; 240 MG/30ML; MG/30ML; MG/30ML
15 SUSPENSION ORAL ONCE
Status: COMPLETED | OUTPATIENT
Start: 2018-11-27 | End: 2018-11-27

## 2018-11-27 RX ADMIN — SODIUM CHLORIDE 1000 ML: 900 INJECTION, SOLUTION INTRAVENOUS at 11:23

## 2018-11-27 RX ADMIN — LIDOCAINE HYDROCHLORIDE 15 ML: 20 SOLUTION ORAL; TOPICAL at 12:18

## 2018-11-27 RX ADMIN — ALUMINUM HYDROXIDE, MAGNESIUM HYDROXIDE, AND DIMETHICONE 15 ML: 400; 400; 40 SUSPENSION ORAL at 12:18

## 2018-11-27 NOTE — ED AVS SNAPSHOT
[facility]    [address]    [place] [address]    Phone:  [phone]    Fax:  [phone]                                       Liliana Yao   MRN: [id number]    Department:  [facility]   Date of Visit:  11/27/2018           After Visit Summary Signature Page     I have received my discharge instructions, and my questions have been answered. I have discussed any challenges I see with this plan with the nurse or doctor.    ..........................................................................................................................................  Patient/Patient Representative Signature      ..........................................................................................................................................  Patient Representative Print Name and Relationship to Patient    ..................................................               ................................................  Date                                   Time    ..........................................................................................................................................  Reviewed by Signature/Title    ...................................................              ..............................................  Date                                               Time          22EPIC Rev 08/18

## 2018-11-27 NOTE — DISCHARGE INSTRUCTIONS
Get plenty of fluids and rest. Take medication as prescribed. F/u with pcp. Return to ED if symptoms worsen.     Uncertain Causes of Chest Pain    Chest pain can happen for a number of reasons. Sometimes the cause can't be determined. If your condition does not seem serious, and your pain does not appear to be coming from your heart, your healthcare provider may recommend watching it closely. Sometimes the signs of a serious problem take more time to appear. Many problems not related to your heart can cause chest pain. These include:    Musculoskeletal. Costochondritis is an inflammation of the tissues around the ribs that can occur from trauma or overuse injuries, or a strain of the muscles of the chest wall    Respiratory. Pneumonia, collapsed lung (pneumothorax), or inflammation of the lining of the chest and lungs (pleurisy)    Gastrointestinal. Esophageal reflux, heartburn, ulcers, or gallbladder disease    Anxiety and panic disorders    Nerve compression and inflammation    Rare miscellaneous problems such as aortic aneurysm (a swelling of the large artery coming out of the heart) or pulmonary embolism (a blood clot in the lungs)  Home care  After your visit, follow these recommendations:    Rest today and avoid strenuous activity.    Take any prescribed medicine as directed.    Be aware of any recurrent chest pain and notice any changes  Follow-up care  Follow up with your healthcare provider if you do not start to feel better within 24 hours, or as advised.  Call 911  Call 911 if any of these occur:    A change in the type of pain: if it feels different, becomes more severe, lasts longer, or begins to spread into your shoulder, arm, neck, jaw or back    Shortness of breath or increased pain with breathing    Weakness, dizziness, or fainting    Rapid heart beat    Crushing sensation in your chest   When to seek medical advice  Call your healthcare provider right away if any of the following occur:    Cough  with dark colored sputum (phlegm) or blood    Fever of 100.4 F (38 C) or higher, or as directed by your healthcare provider    Swelling, pain or redness in one leg  Date Last Reviewed: 5/1/2018 2000-2018 [employer]. [address] [place] [address]. All rights reserved. This information is not intended as a substitute for professional medical care. Always follow your healthcare professional's instructions.

## 2018-11-27 NOTE — ED TRIAGE NOTES
COLUMBIA-SUICIDE SEVERITY RATING SCALE   Screen with Triage Points for Emergency Department      Ask questions that are bolded and underlined.   Past  month   Ask Questions 1 and 2 YES NO   1)  Have you wished you were dead or wished you could go to sleep and not wake up?   X   2)  Have you actually had any thoughts of killing yourself?   X   If YES to 2, ask questions 3, 4, 5, and 6.  If NO to 2, go directly to question 6.   3)  Have you been thinking about how you might do this?   E.g.  I thought about taking an overdose but I never made a specific plan as to when where or how I would actually do it .and I would never go through with it.       4)  Have you had these thoughts and had some intention of acting on them?   As opposed to  I have the thoughts but I definitely will not do anything about them.       5)  Have you started to work out or worked out the details of how to kill yourself? Do you intend to carry out this plan?      6)  Have you ever done anything, started to do anything, or prepared to do anything to end your life?  Examples: Collected pills, obtained a gun, gave away valuables, wrote a will or suicide note, took out pills but didn t swallow any, held a gun but changed your mind or it was grabbed from your hand, went to the roof but didn t jump; or actually took pills, tried to shoot yourself, cut yourself, tried to hang yourself, etc.    If YES, ask: Was this within the past three months?  Lifetime    X     Past 3 Months     X   Item 1:  Behavioral Health Referral at Discharge  Item 2:  Behavioral Health Referral at Discharge   Item 3:  Behavioral Health Consult (Psychiatric Nurse/) and consider Patient Safety Precautions  Item 4:  Immediate Notification of Physician and/or Behavioral Health and Patient Safety Precautions   Item 5:  Immediate Notification of Physician and/or Behavioral Health and Patient Safety Precautions  Item 6:  Over 3 months ago: Behavioral Health Consult  (Psychiatric Nurse/) and consider Patient Safety Precautions  OR  Item 6:  3 months ago or less: Immediate Notification of Physician and/or Behavioral Health and Patient Safety Precautions     Suicidal Ideation during teenage years per patient.

## 2018-11-27 NOTE — ED AVS SNAPSHOT
[facility]    [address]    [place] [address]    Phone:  [phone]    Fax:  [phone]                                       Liliana Yao   MRN: [id number]    Department:  [facility]   Date of Visit:  11/27/2018           Patient Information     Date Of Birth          1975        Your diagnoses for this visit were:     Atypical chest pain        You were seen by Elías Jones PA.      Follow-up Information     Follow up with Khushbu Mccormick NP.    Why:  As needed    Contact information:    [facility]  [address] [place] [address] [phone]          Discharge Instructions         Get plenty of fluids and rest. Take medication as prescribed. F/u with pcp. Return to ED if symptoms worsen.     Uncertain Causes of Chest Pain    Chest pain can happen for a number of reasons. Sometimes the cause can't be determined. If your condition does not seem serious, and your pain does not appear to be coming from your heart, your healthcare provider may recommend watching it closely. Sometimes the signs of a serious problem take more time to appear. Many problems not related to your heart can cause chest pain. These include:    Musculoskeletal. Costochondritis is an inflammation of the tissues around the ribs that can occur from trauma or overuse injuries, or a strain of the muscles of the chest wall    Respiratory. Pneumonia, collapsed lung (pneumothorax), or inflammation of the lining of the chest and lungs (pleurisy)    Gastrointestinal. Esophageal reflux, heartburn, ulcers, or gallbladder disease    Anxiety and panic disorders    Nerve compression and inflammation    Rare miscellaneous problems such as aortic aneurysm (a swelling of the large artery coming out of the heart) or pulmonary embolism (a blood clot in the lungs)  Home care  After your visit, follow these recommendations:    Rest today and avoid strenuous  activity.    Take any prescribed medicine as directed.    Be aware of any recurrent chest pain and notice any changes  Follow-up care  Follow up with your healthcare provider if you do not start to feel better within 24 hours, or as advised.  Call 911  Call 911 if any of these occur:    A change in the type of pain: if it feels different, becomes more severe, lasts longer, or begins to spread into your shoulder, arm, neck, jaw or back    Shortness of breath or increased pain with breathing    Weakness, dizziness, or fainting    Rapid heart beat    Crushing sensation in your chest   When to seek medical advice  Call your healthcare provider right away if any of the following occur:    Cough with dark colored sputum (phlegm) or blood    Fever of 100.4 F (38 C) or higher, or as directed by your healthcare provider    Swelling, pain or redness in one leg  Date Last Reviewed: 5/1/2018 2000-2018 [employer]. [address] [place] [address]. All rights reserved. This information is not intended as a substitute for professional medical care. Always follow your healthcare professional's instructions.          24 Hour Appointment Hotline     To schedule an appointment at [facility], please call [phone]. If you don't have a family doctor or clinic, we will help you find one. [facility] are conveniently located to serve the needs of you and your family.           Review of your medicines      START taking        Dose / Directions Last dose taken    omeprazole 20 MG DR capsule   Commonly known as:  priLOSEC   Dose:  20 mg   Quantity:  30 capsule        Take 1 capsule (20 mg) by mouth daily   Refills:  0          Our records show that you are taking the medicines listed below. If these are incorrect, please call your family doctor or clinic.        Dose / Directions Last dose taken    escitalopram 20 MG tablet   Commonly known as:  LEXAPRO   Dose:  20 mg        Take 20 mg by mouth daily    Refills:  0        glucagon 1 MG kit   Dose:  1 mg        Inject 1 mg into the muscle   Refills:  0        insulin detemir 100 UNIT/ML pen   Commonly known as:  LEVEMIR PEN        Take 35 units subcutaneous in the morning and 35 units subcutaneous in the evening   Refills:  0        levothyroxine 75 MCG tablet   Commonly known as:  SYNTHROID/LEVOTHROID   Dose:  75 mcg        Take 75 mcg by mouth daily   Refills:  0        liraglutide 18 MG/3ML solution   Commonly known as:  VICTOZA   Dose:  1.2 mg        Inject 1.2 mg Subcutaneous daily   Refills:  0        lisinopril 10 MG tablet   Commonly known as:  PRINIVIL/ZESTRIL   Dose:  10 mg        Take 10 mg by mouth daily   Refills:  0        LORazepam 1 MG tablet   Commonly known as:  ATIVAN   Dose:  1 mg        Take 1 mg by mouth 3 times daily   Refills:  0        metFORMIN 500 MG tablet   Commonly known as:  GLUCOPHAGE   Dose:  1000 mg        Take 1,000 mg by mouth 2 times daily (with meals)   Refills:  0        ONETOUCH ULTRA test strip   Generic drug:  blood glucose monitoring        As directed. Dispense test strips covered by the patient insurance. Test 3-4 times per day.   Refills:  0        Pen Needles 29G X 12MM Misc        As directed. 31 G x 6 MM . Use with Victoza to inject subcu once daily   Refills:  0        simvastatin 20 MG tablet   Commonly known as:  ZOCOR   Dose:  20 mg        Take 20 mg by mouth daily   Refills:  0                Prescriptions were sent or printed at these locations (1 Prescription)                   [facility] [address] [place], [place] - [address]   [address], [facility] [address]    Telephone:  [phone]   Fax:  [phone]   Hours:                  E-Prescribed (1 of 1)         omeprazole (PRILOSEC) 20 MG DR capsule                Procedures and tests performed during your visit     *UA reflex to Microscopic    Basic metabolic panel    CBC with platelets differential    EKG 12  lead    HCG qualitative urine    Thyrotropin GH    Troponin I    Troponin I (second draw)    XR Chest 2 Views      Orders Needing Specimen Collection     None      Pending Results     No orders found from 11/25/2018 to 11/28/2018.            Pending Culture Results     No orders found from 11/25/2018 to 11/28/2018.            Pending Results Instructions     If you had any lab results that were not finalized at the time of your Discharge, you can call the ED Lab Result RN at [phone]. You will be contacted by this team for any positive Lab results or changes in treatment. The nurses are available 7 days a week from 10A to 6:30P.  You can leave a message 24 hours per day and they will return your call.        Thank you for choosing [facility]       Thank you for choosing [facility] for your care. Our goal is always to provide you with excellent care. Hearing back from our patients is one way we can continue to improve our services. Please take a few minutes to complete the written survey that you may receive in the mail after you visit with us. Thank you!        Care EveryWhere ID     This is your Care EveryWhere ID. This could be used by other organizations to access your [facility] medical records  [id number]        Equal Access to Services     JOSE M ANGEL : Parveen Oro, stuart lam, cj noriega. So [facility] [phone].    ATENCIÓN: Si habla español, tiene a thapa disposición servicios gratuitos de asistencia lingüística. Llame al [phone].    We comply with applicable federal civil rights laws and [place] laws. We do not discriminate on the basis of race, color, national origin, age, disability, sex, sexual orientation, or gender identity.            After Visit Summary       This is your record. Keep this with you and show to your community pharmacist(s) and doctor(s) at your next visit.

## 2018-11-27 NOTE — ED TRIAGE NOTES
Patient complaining of rapid heart beat and feeling short of breath with chest pain that radiated into her throat about 15 minutes prior to arrival.   Patient denies chest pain at this time.      Patient stated she didn't know if she was having an anxiety attack and took an ativan.   Patient denies any cardiac history.   Is feeling better while sitting in triage.

## 2018-11-27 NOTE — PROGRESS NOTES
LAWRENCEMARLYNSHAVONNETOÑA MOSQUERA    43 Y old Female, : 1975    Account Number: [id number]    [address] [place] [address] [address], [id number]    Home: [phone]     Guarantor: RONAN SEVILLA Insurance: [facility] MEDICARE B Payer ID: [id number]   PCP: Khushbu Mccormick CNP    Appointment Facility: [facility]      2018 Progress Notes: Paco Izquierdo MD     Reason for Appointment   1. RECHECK EARS AFTER MEDS   2. Postop check     History of Present Illness   HPI:   The patient is a 43-year-old female who is status post atticotomy and tympanoplasty on the left. The drainage has settled. She is having no lingering pain.     Examination   General Examination:  On the left the T-tube is in place and patent. There is no active drainage at this time. Atticotomy defect appears clean. The right ear is healthy   The remainder of the head neck exam is unremarkable.       Assessments     1. Postop check - Z09 (Primary)     Treatment   1. Others   Notes: She will check in with me in 6 months, sooner if she has difficulties.  Procedures  [ ].      Follow Up   6 Months   Electronically signed by PACO IZQUIERDO MD on 2018 at 11:48 AM CST Sign off status: Completed     [facility]  [address] [place] [address]  Tel: [phone]  Fax:           Patient: RONAN SEVILLA : 1975 Progress Note: Paco Izquierdo MD 2018      Note generated by "[employer]" EMR/PM Software ([email or web address])

## 2018-11-29 ASSESSMENT — ENCOUNTER SYMPTOMS: SHORTNESS OF BREATH: 1

## 2018-11-30 NOTE — ED PROVIDER NOTES
History     Chief Complaint   Patient presents with     Shortness of Breath     Tachycardia     HPI  Liliana Yao is a 43 year old female who presents to the ED today with a chief complaint of shortness of breath and chest pain.  Patient reported chest discomfort as well as a rapid heartbeat feeling short of breath with radiation to her throat about 15 minutes prior to arrival.  Upon seeing patient in the ED she has no chest pain.  Patient is unsure if she was having an anxiety attack or not.  She has had anxiety attacks in the past and this feels somewhat similar although somewhat different.  Patient denies nausea or vomiting, sweating.    Problem List:    Patient Active Problem List    Diagnosis Date Noted     Anxiety state 02/08/2018     Priority: Medium     Overview:   with psychotic features       Major depressive disorder, recurrent episode, moderate (H) 02/08/2018     Priority: Medium     Overview:   Clinical       Diabetes mellitus, type II (H) 02/08/2018     Priority: Medium     Overview:   Noncompliant  a1c = 8.9 on 4/14/15.   Referred to eye doctor.  On aspirin.  On statin/ace        Hx of self mutilation 02/08/2018     Priority: Medium     Overview:   none in last 10 years       Morbid obesity (H) 02/08/2018     Priority: Medium     Overview:   Noncompliant       Vitamin D deficiency 02/08/2018     Priority: Medium     Goiter 12/31/2012     Priority: Medium     Low back pain syndrome 08/29/2011     Priority: Medium     Essential hypertension, benign 05/18/2011     Priority: Medium     Knee pain 02/15/2011     Priority: Medium     Overview:   left       Calcaneal spur, left 12/01/2010     Priority: Medium     Plantar fasciitis 09/28/2010     Priority: Medium     Hearing loss 01/13/2010     Priority: Medium     Hypercholesterolemia 02/01/2007     Priority: Medium        Past Medical History:    Past Medical History:   Diagnosis Date     Allergic rhinitis      Coagulation defect (H)      Diarrhea       Encounter for other general examination (CODE)      Obesity      Otitis media      Personal history of other mental and behavioral disorders      Sebaceous cyst      Torticollis      Uncomplicated asthma        Past Surgical History:    Past Surgical History:   Procedure Laterality Date     EXCISE CYST GENERIC (LOCATION)      The patient had right ganglion cyst removal.     TYMPANOSTOMY, LOCAL/TOPICAL ANESTHESIA      PE tubes at age 4 & 24     TYMPANOSTOMY, LOCAL/TOPICAL ANESTHESIA      07/08/2008,Tube placement by Dr. Paco Izquierdo       Family History:    Family History   Problem Relation Age of Onset     Hypertension Mother      Hypertension,Hypertension.     Other - See Comments Mother       Chronic heart failure.  Dense fibrocystic breast disease.     Breast Cancer Paternal Grandmother      Cancer-breast,breast cancer, mastectomy     Diabetes Father      Diabetes,Multiple members on father's side with diabetes     Breast Cancer Paternal Aunt      Cancer-breast, bilateral mastectomy for breast cancer       Social History:  Marital Status:  Single [1]  Social History   Substance Use Topics     Smoking status: Former Smoker     Packs/day: 0.50     Years: 12.00     Types: Cigarettes     Quit date: 9/13/1997     Smokeless tobacco: Never Used     Alcohol use 0.6 oz/week      Comment: Alcoholic Drinks/day: 1 per month        Medications:      escitalopram (LEXAPRO) 20 MG tablet   insulin detemir (LEVEMIR) 100 UNIT/ML injection   levothyroxine (SYNTHROID/LEVOTHROID) 75 MCG tablet   liraglutide (VICTOZA) 18 MG/3ML soln   lisinopril (PRINIVIL/ZESTRIL) 10 MG tablet   LORazepam (ATIVAN) 1 MG tablet   metFORMIN (GLUCOPHAGE) 500 MG tablet   omeprazole (PRILOSEC) 20 MG DR capsule   simvastatin (ZOCOR) 20 MG tablet   blood glucose monitoring (ONETOUCH ULTRA) test strip   glucagon 1 MG kit   Insulin Pen Needle (PEN NEEDLES) 29G X 12MM MISC         Review of Systems   Respiratory: Positive for shortness of breath.   "  Cardiovascular: Positive for chest pain.   All other systems reviewed and are negative.      Physical Exam   BP: 147/88  Heart Rate: 104  Temp: 97.7  F (36.5  C)  Resp: 18  Height: 149.9 cm (4' 11\")  Weight: 83.5 kg (184 lb)  SpO2: 95 %      Physical Exam   Constitutional: She is oriented to person, place, and time. She appears well-developed and well-nourished. No distress.   HENT:   Head: Normocephalic and atraumatic.   Eyes: Pupils are equal, round, and reactive to light.   Neck: Normal range of motion.   Cardiovascular: Normal rate and normal heart sounds.    No murmur heard.  Pulmonary/Chest: Effort normal and breath sounds normal. No respiratory distress. She has no wheezes. She exhibits no tenderness.   Abdominal: Soft. Bowel sounds are normal. She exhibits no distension and no mass. There is no tenderness.   Musculoskeletal: Normal range of motion.   Neurological: She is alert and oriented to person, place, and time.   Skin: Skin is warm. She is not diaphoretic.   Psychiatric: She has a normal mood and affect. Her behavior is normal. Judgment and thought content normal.       ED Course     ED Course     Procedures           EKG read at 1110, heart rate 91, normal sinus rhythm, inverted T waves in leads V1 through V3, similar to past EKGs.    Critical Care time:  none               No results found for this or any previous visit (from the past 24 hour(s)).    Medications   0.9% sodium chloride BOLUS (0 mLs Intravenous Stopped 11/27/18 1335)   alum & mag hydroxide-simethicone (MYLANTA ES/MAALOX  ES) suspension 15 mL (15 mLs Oral Given 11/27/18 1218)     And   lidocaine VISCOUS (XYLOCAINE) 2 % solution 15 mL (15 mLs Mouth/Throat Given 11/27/18 1218)       Assessments & Plan (with Medical Decision Making)   Patient seen and examined.  Patient is nontoxic-appearing no acute distress.  Heart, lung, bowel sounds normal.  Abdomen soft nontender to palpation, nondistended.  Patient had good peripheral pulses in all " extremities.  This time I will do a cardiac workup.  Patient was PERC negative.    Patient had 2- troponins and unremarkable CBC and BMP.  Normal chest x-ray.  Normal sinus rhythm on EKG.  Patient was given a GI cocktail and reported feeling much better.  However, patient did remain chest pain-free throughout her entire stay in the ED.  Patient had a heart score of 1..  I am encouraged by the well appearance patient as well as her stable vital signs and reassuring diagnostic studies.  Patient is to follow-up with her PCP as needed or return to the ED if symptoms worsen.  Patient was prescribed omeprazole.  Patient understood and agreed with the plan and patient was discharged.    Elías Jones PA-C    I discussed my findings with the patient  I have reviewed the nursing notes.    I have reviewed the findings, diagnosis, plan and need for follow up with the patient.       Discharge Medication List as of 11/27/2018  1:36 PM      START taking these medications    Details   omeprazole (PRILOSEC) 20 MG DR capsule Take 1 capsule (20 mg) by mouth daily, Disp-30 capsule, R-0, E-Prescribe             Final diagnoses:   Atypical chest pain       11/27/2018   [facility]     Elías Jones PA  11/29/18 2031

## 2019-03-08 ENCOUNTER — HOSPITAL ENCOUNTER (EMERGENCY)
Facility: OTHER | Age: 44
Discharge: HOME | End: 2019-03-08
Attending: FAMILY MEDICINE | Admitting: FAMILY MEDICINE
Payer: MEDICARE

## 2019-03-08 VITALS
HEART RATE: 105 BPM | BODY MASS INDEX: 36.29 KG/M2 | DIASTOLIC BLOOD PRESSURE: 63 MMHG | SYSTOLIC BLOOD PRESSURE: 106 MMHG | HEIGHT: 59 IN | TEMPERATURE: 97.6 F | OXYGEN SATURATION: 90 % | RESPIRATION RATE: 20 BRPM | WEIGHT: 180 LBS

## 2019-03-08 DIAGNOSIS — R11.2 NAUSEA AND VOMITING, INTRACTABILITY OF VOMITING NOT SPECIFIED, UNSPECIFIED VOMITING TYPE: ICD-10-CM

## 2019-03-08 LAB
ALBUMIN SERPL-MCNC: 4.1 G/DL (ref 3.5–5.7)
ALBUMIN UR-MCNC: 100 MG/DL
ALP SERPL-CCNC: 70 U/L (ref 34–104)
ALT SERPL W P-5'-P-CCNC: 24 U/L (ref 7–52)
ANION GAP SERPL CALCULATED.3IONS-SCNC: 9 MMOL/L (ref 3–14)
APPEARANCE UR: CLEAR
AST SERPL W P-5'-P-CCNC: 18 U/L (ref 13–39)
BACTERIA #/AREA URNS HPF: ABNORMAL /HPF
BASOPHILS # BLD AUTO: 0 10E9/L (ref 0–0.2)
BASOPHILS NFR BLD AUTO: 0.3 %
BILIRUB SERPL-MCNC: 0.7 MG/DL (ref 0.3–1)
BILIRUB UR QL STRIP: NEGATIVE
BUN SERPL-MCNC: 18 MG/DL (ref 7–25)
CALCIUM SERPL-MCNC: 9.4 MG/DL (ref 8.6–10.3)
CHLORIDE SERPL-SCNC: 97 MMOL/L (ref 98–107)
CO2 SERPL-SCNC: 24 MMOL/L (ref 21–31)
COLOR UR AUTO: YELLOW
CREAT SERPL-MCNC: 0.61 MG/DL (ref 0.6–1.2)
DIFFERENTIAL METHOD BLD: ABNORMAL
EOSINOPHIL # BLD AUTO: 0.1 10E9/L (ref 0–0.7)
EOSINOPHIL NFR BLD AUTO: 0.8 %
ERYTHROCYTE [DISTWIDTH] IN BLOOD BY AUTOMATED COUNT: 12.6 % (ref 10–15)
GFR SERPL CREATININE-BSD FRML MDRD: >90 ML/MIN/{1.73_M2}
GLUCOSE SERPL-MCNC: 326 MG/DL (ref 70–105)
GLUCOSE UR STRIP-MCNC: 500 MG/DL
GRAN CASTS #/AREA URNS LPF: ABNORMAL /LPF
HCG UR QL: NEGATIVE
HCT VFR BLD AUTO: 43.2 % (ref 35–47)
HGB BLD-MCNC: 14.5 G/DL (ref 11.7–15.7)
HGB UR QL STRIP: ABNORMAL
HYALINE CASTS #/AREA URNS LPF: ABNORMAL /LPF
IMM GRANULOCYTES # BLD: 0.1 10E9/L (ref 0–0.4)
IMM GRANULOCYTES NFR BLD: 0.6 %
KETONES UR STRIP-MCNC: 15 MG/DL
LEUKOCYTE ESTERASE UR QL STRIP: NEGATIVE
LIPASE SERPL-CCNC: 21 U/L (ref 11–82)
LYMPHOCYTES # BLD AUTO: 1.2 10E9/L (ref 0.8–5.3)
LYMPHOCYTES NFR BLD AUTO: 8.6 %
MCH RBC QN AUTO: 29.4 PG (ref 26.5–33)
MCHC RBC AUTO-ENTMCNC: 33.6 G/DL (ref 31.5–36.5)
MCV RBC AUTO: 88 FL (ref 78–100)
MONOCYTES # BLD AUTO: 1 10E9/L (ref 0–1.3)
MONOCYTES NFR BLD AUTO: 7.7 %
MUCOUS THREADS #/AREA URNS LPF: PRESENT /LPF
NEUTROPHILS # BLD AUTO: 11.1 10E9/L (ref 1.6–8.3)
NEUTROPHILS NFR BLD AUTO: 82 %
NITRATE UR QL: NEGATIVE
NON-SQ EPI CELLS #/AREA URNS LPF: ABNORMAL /LPF
PH UR STRIP: 5.5 PH (ref 5–9)
PLATELET # BLD AUTO: 193 10E9/L (ref 150–450)
POTASSIUM SERPL-SCNC: 4.2 MMOL/L (ref 3.5–5.1)
PROT SERPL-MCNC: 8.4 G/DL (ref 6.4–8.9)
RBC # BLD AUTO: 4.93 10E12/L (ref 3.8–5.2)
RBC #/AREA URNS AUTO: ABNORMAL /HPF
SODIUM SERPL-SCNC: 130 MMOL/L (ref 134–144)
SOURCE: ABNORMAL
SP GR UR STRIP: >1.03 (ref 1–1.03)
TROPONIN I SERPL-MCNC: <0.03 UG/L (ref 0–0.03)
UROBILINOGEN UR STRIP-ACNC: 0.2 EU/DL (ref 0.2–1)
WBC # BLD AUTO: 13.5 10E9/L (ref 4–11)
WBC #/AREA URNS AUTO: ABNORMAL /HPF

## 2019-03-08 PROCEDURE — 83690 ASSAY OF LIPASE: CPT | Performed by: FAMILY MEDICINE

## 2019-03-08 PROCEDURE — 96375 TX/PRO/DX INJ NEW DRUG ADDON: CPT | Performed by: FAMILY MEDICINE

## 2019-03-08 PROCEDURE — 80053 COMPREHEN METABOLIC PANEL: CPT | Performed by: FAMILY MEDICINE

## 2019-03-08 PROCEDURE — 81001 URINALYSIS AUTO W/SCOPE: CPT | Performed by: FAMILY MEDICINE

## 2019-03-08 PROCEDURE — 36415 COLL VENOUS BLD VENIPUNCTURE: CPT | Performed by: FAMILY MEDICINE

## 2019-03-08 PROCEDURE — 25000128 H RX IP 250 OP 636: Performed by: FAMILY MEDICINE

## 2019-03-08 PROCEDURE — 81025 URINE PREGNANCY TEST: CPT | Performed by: FAMILY MEDICINE

## 2019-03-08 PROCEDURE — 84484 ASSAY OF TROPONIN QUANT: CPT | Performed by: FAMILY MEDICINE

## 2019-03-08 PROCEDURE — 99283 EMERGENCY DEPT VISIT LOW MDM: CPT | Mod: Z6 | Performed by: FAMILY MEDICINE

## 2019-03-08 PROCEDURE — 99284 EMERGENCY DEPT VISIT MOD MDM: CPT | Mod: 25 | Performed by: FAMILY MEDICINE

## 2019-03-08 PROCEDURE — 85025 COMPLETE CBC W/AUTO DIFF WBC: CPT | Performed by: FAMILY MEDICINE

## 2019-03-08 PROCEDURE — 96374 THER/PROPH/DIAG INJ IV PUSH: CPT | Performed by: FAMILY MEDICINE

## 2019-03-08 RX ORDER — DIPHENHYDRAMINE HYDROCHLORIDE 50 MG/ML
25 INJECTION INTRAMUSCULAR; INTRAVENOUS ONCE
Status: COMPLETED | OUTPATIENT
Start: 2019-03-08 | End: 2019-03-08

## 2019-03-08 RX ORDER — LORAZEPAM 2 MG/ML
1 INJECTION INTRAMUSCULAR ONCE
Status: COMPLETED | OUTPATIENT
Start: 2019-03-08 | End: 2019-03-08

## 2019-03-08 RX ORDER — SODIUM CHLORIDE 9 MG/ML
1000 INJECTION, SOLUTION INTRAVENOUS CONTINUOUS
Status: DISCONTINUED | OUTPATIENT
Start: 2019-03-08 | End: 2019-03-08 | Stop reason: HOSPADM

## 2019-03-08 RX ADMIN — DIPHENHYDRAMINE HYDROCHLORIDE 25 MG: 50 INJECTION INTRAMUSCULAR; INTRAVENOUS at 02:26

## 2019-03-08 RX ADMIN — PROCHLORPERAZINE EDISYLATE 10 MG: 5 INJECTION INTRAMUSCULAR; INTRAVENOUS at 02:27

## 2019-03-08 RX ADMIN — SODIUM CHLORIDE 1000 ML: 900 INJECTION, SOLUTION INTRAVENOUS at 02:26

## 2019-03-08 RX ADMIN — LORAZEPAM 1 MG: 2 INJECTION INTRAMUSCULAR; INTRAVENOUS at 03:17

## 2019-03-08 ASSESSMENT — ENCOUNTER SYMPTOMS
VOMITING: 1
NAUSEA: 1
BLOOD IN STOOL: 0
DIARRHEA: 1
BRUISES/BLEEDS EASILY: 0
COLOR CHANGE: 0
ABDOMINAL PAIN: 1
ARTHRALGIAS: 0
NECK STIFFNESS: 0
DYSURIA: 0
DIFFICULTY URINATING: 0
AGITATION: 0
CONFUSION: 0
EYE REDNESS: 0
BACK PAIN: 0
SPEECH DIFFICULTY: 0
FEVER: 0
SHORTNESS OF BREATH: 0
HEADACHES: 0
RECTAL PAIN: 0
ADENOPATHY: 0
ANAL BLEEDING: 0

## 2019-03-08 ASSESSMENT — MIFFLIN-ST. JEOR: SCORE: 1387.1

## 2019-03-08 NOTE — ED NOTES
Pt states she is feeling somewhat better and is wondering when she can go home.  MD updated and in to talk with patient.

## 2019-03-08 NOTE — ED AVS SNAPSHOT
[facility]  [address]  [place] [address]  Phone:  [phone]  Fax:  [phone]                                    Liliana Yao   MRN: [id number]    Department:  [facility]   Date of Visit:  3/8/2019           After Visit Summary Signature Page    I have received my discharge instructions, and my questions have been answered. I have discussed any challenges I see with this plan with the nurse or doctor.    ..........................................................................................................................................  Patient/Patient Representative Signature      ..........................................................................................................................................  Patient Representative Print Name and Relationship to Patient    ..................................................               ................................................  Date                                   Time    ..........................................................................................................................................  Reviewed by Signature/Title    ...................................................              ..............................................  Date                                               Time          22EPIC Rev 08/18

## 2019-03-08 NOTE — ED TRIAGE NOTES
Patient presents with c/o abdominal pain and chest pain. Pain started tonight. Patient has been vomiting and having diarrhea. States the pain radiates from her abdomen to her chest. Denies hx of reflux. Does have anxiety. States one of her diabetic medications gives her anxiety. Jane Koehler RN on 3/8/2019 at 2:36 AM

## 2019-03-08 NOTE — ED PROVIDER NOTES
History     Chief Complaint   Patient presents with     Abdominal Pain     Chest Pain     HPI  Liliana Yao is a 43 year old female who comes into the emergency department with pain in her upper abdomen and lower chest.  She thinks it may have been peanut butter that she ingested tonight as she had abrupt onset of nausea vomiting and loose stools.  She stated the peanut butter was about 3 hours old.  No blood in her stool no blood in her emesis.  Reviewed nurse's notes below, similar history is related to me however she does not really endorse chest pain she says it is kind of lower in her chest below her sternum.  No recent fevers no cough congestion.  Patient presents with c/o abdominal pain and chest pain. Pain started tonight. Patient has been vomiting and having diarrhea. States the pain radiates from her abdomen to her chest. Denies hx of reflux. Does have anxiety. States one of her diabetic medications gives her anxiety.       Allergies:  Allergies   Allergen Reactions     Acetone Shortness Of Breath     Other reaction(s): Erythema     Hazelnuts [Nuts] Rash     Sulindac Rash     weakness       Problem List:    Patient Active Problem List    Diagnosis Date Noted     Anxiety state 02/08/2018     Priority: Medium     Overview:   with psychotic features       Major depressive disorder, recurrent episode, moderate (H) 02/08/2018     Priority: Medium     Overview:   Clinical       Diabetes mellitus, type II (H) 02/08/2018     Priority: Medium     Overview:   Noncompliant  a1c = 8.9 on 4/14/15.   Referred to eye doctor.  On aspirin.  On statin/ace        Hx of self mutilation 02/08/2018     Priority: Medium     Overview:   none in last 10 years       Morbid obesity (H) 02/08/2018     Priority: Medium     Overview:   Noncompliant       Vitamin D deficiency 02/08/2018     Priority: Medium     Goiter 12/31/2012     Priority: Medium     Low back pain syndrome 08/29/2011     Priority: Medium     Essential  hypertension, benign 2011     Priority: Medium     Knee pain 02/15/2011     Priority: Medium     Overview:   left       Calcaneal spur, left 2010     Priority: Medium     Plantar fasciitis 2010     Priority: Medium     Hearing loss 2010     Priority: Medium     Hypercholesterolemia 2007     Priority: Medium        Past Medical History:    Past Medical History:   Diagnosis Date     Allergic rhinitis      Coagulation defect (H)      Diarrhea      Encounter for other general examination (CODE)      Obesity      Otitis media      Personal history of other mental and behavioral disorders      Sebaceous cyst      Torticollis      Uncomplicated asthma        Past Surgical History:    Past Surgical History:   Procedure Laterality Date     EXCISE CYST GENERIC (LOCATION)      The patient had right ganglion cyst removal.     TYMPANOSTOMY, LOCAL/TOPICAL ANESTHESIA      PE tubes at age 4 & 24     TYMPANOSTOMY, LOCAL/TOPICAL ANESTHESIA      2008,Tube placement by Dr. Paco Izquierdo       Family History:    Family History   Problem Relation Age of Onset     Hypertension Mother         Hypertension,Hypertension.     Other - See Comments Mother          Chronic heart failure.  Dense fibrocystic breast disease.     Breast Cancer Paternal Grandmother         Cancer-breast,breast cancer, mastectomy     Diabetes Father         Diabetes,Multiple members on father's side with diabetes     Breast Cancer Paternal Aunt         Cancer-breast, bilateral mastectomy for breast cancer       Social History:  Marital Status:  Single [1]  Social History     Tobacco Use     Smoking status: Former Smoker     Packs/day: 0.50     Years: 12.00     Pack years: 6.00     Types: Cigarettes     Last attempt to quit: 1997     Years since quittin.4     Smokeless tobacco: Never Used   Substance Use Topics     Alcohol use: Yes     Alcohol/week: 0.6 oz     Comment: Alcoholic Drinks/day: 1 per month     Drug use: Unknown  "    Types: Other     Comment: Drug use: No        Medications:      escitalopram (LEXAPRO) 20 MG tablet   insulin detemir (LEVEMIR) 100 UNIT/ML injection   levothyroxine (SYNTHROID/LEVOTHROID) 75 MCG tablet   liraglutide (VICTOZA) 18 MG/3ML soln   lisinopril (PRINIVIL/ZESTRIL) 10 MG tablet   LORazepam (ATIVAN) 1 MG tablet   metFORMIN (GLUCOPHAGE) 500 MG tablet   simvastatin (ZOCOR) 20 MG tablet   blood glucose monitoring (ONETOUCH ULTRA) test strip   glucagon 1 MG kit   Insulin Pen Needle (PEN NEEDLES) 29G X 12MM MISC         Review of Systems   Constitutional: Negative for fever.   HENT: Negative for congestion.    Eyes: Negative for redness.   Respiratory: Negative for shortness of breath.    Gastrointestinal: Positive for abdominal pain, diarrhea, nausea and vomiting. Negative for anal bleeding, blood in stool and rectal pain.   Genitourinary: Negative for difficulty urinating and dysuria.   Musculoskeletal: Negative for arthralgias, back pain and neck stiffness.   Skin: Negative for color change.   Neurological: Negative for speech difficulty and headaches.   Hematological: Negative for adenopathy. Does not bruise/bleed easily.   Psychiatric/Behavioral: Negative for agitation and confusion.       Physical Exam   BP: 108/77  Pulse: 113  Heart Rate: 123  Temp: 97.6  F (36.4  C)  Resp: 16  Height: 149.9 cm (4' 11\")  Weight: 81.6 kg (180 lb)  SpO2: 96 %      Physical Exam   Constitutional: She is oriented to person, place, and time. She appears well-developed and well-nourished. She appears distressed (Presents mildly distressed).   HENT:   Head: Atraumatic.   Mouth/Throat: Oropharynx is clear and moist. No oropharyngeal exudate.   Eyes: Pupils are equal, round, and reactive to light. No scleral icterus.   Cardiovascular: Normal heart sounds and intact distal pulses.   Pulmonary/Chest: Breath sounds normal. No stridor. No respiratory distress. She has no wheezes. She has no rales.   Abdominal: Soft. Bowel sounds are " normal. There is no tenderness.   Musculoskeletal: She exhibits no edema or tenderness.   Neurological: She is alert and oriented to person, place, and time. No cranial nerve deficit.   Skin: Skin is warm. Capillary refill takes less than 2 seconds. No rash noted. She is not diaphoretic.   Nursing note and vitals reviewed.      ED Course     ED Course as of Mar 08 0334   Fri Mar 08, 2019   0330 Patient put on her call light, states she is feeling better and would like to go home.  I encouraged her to wait for the rest of her labs before we discharge her home.      Procedures          Results for orders placed or performed during the hospital encounter of 03/08/19 (from the past 24 hour(s))   CBC with platelets differential   Result Value Ref Range    WBC 13.5 (H) 4.0 - 11.0 10e9/L    RBC Count 4.93 3.8 - 5.2 10e12/L    Hemoglobin 14.5 11.7 - 15.7 g/dL    Hematocrit 43.2 35.0 - 47.0 %    MCV 88 78 - 100 fl    MCH 29.4 26.5 - 33.0 pg    MCHC 33.6 31.5 - 36.5 g/dL    RDW 12.6 10.0 - 15.0 %    Platelet Count 193 150 - 450 10e9/L    Diff Method Automated Method     % Neutrophils 82.0 %    % Lymphocytes 8.6 %    % Monocytes 7.7 %    % Eosinophils 0.8 %    % Basophils 0.3 %    % Immature Granulocytes 0.6 %    Absolute Neutrophil 11.1 (H) 1.6 - 8.3 10e9/L    Absolute Lymphocytes 1.2 0.8 - 5.3 10e9/L    Absolute Monocytes 1.0 0.0 - 1.3 10e9/L    Absolute Eosinophils 0.1 0.0 - 0.7 10e9/L    Absolute Basophils 0.0 0.0 - 0.2 10e9/L    Abs Immature Granulocytes 0.1 0 - 0.4 10e9/L   Comprehensive metabolic panel   Result Value Ref Range    Sodium 130 (L) 134 - 144 mmol/L    Potassium 4.2 3.5 - 5.1 mmol/L    Chloride 97 (L) 98 - 107 mmol/L    Carbon Dioxide 24 21 - 31 mmol/L    Anion Gap 9 3 - 14 mmol/L    Glucose 326 (H) 70 - 105 mg/dL    Urea Nitrogen 18 7 - 25 mg/dL    Creatinine 0.61 0.60 - 1.20 mg/dL    GFR Estimate >90 >60 mL/min/[1.73_m2]    GFR Estimate If Black >90 >60 mL/min/[1.73_m2]    Calcium 9.4 8.6 - 10.3 mg/dL     Bilirubin Total 0.7 0.3 - 1.0 mg/dL    Albumin 4.1 3.5 - 5.7 g/dL    Protein Total 8.4 6.4 - 8.9 g/dL    Alkaline Phosphatase 70 34 - 104 U/L    ALT 24 7 - 52 U/L    AST 18 13 - 39 U/L   Lipase   Result Value Ref Range    Lipase 21 11 - 82 U/L   Troponin I   Result Value Ref Range    Troponin I ES <0.030 0.000 - 0.034 ug/L   HCG qualitative urine (UPT)   Result Value Ref Range    HCG Qual Urine Negative NEG^Negative   UA reflex to Microscopic and Culture   Result Value Ref Range    Color Urine Yellow     Appearance Urine Clear     Glucose Urine 500 (A) NEG^Negative mg/dL    Bilirubin Urine Negative NEG^Negative    Ketones Urine 15 (A) NEG^Negative mg/dL    Specific Gravity Urine >1.030 (H) 1.000 - 1.030    Blood Urine Trace (A) NEG^Negative    pH Urine 5.5 5.0 - 9.0 pH    Protein Albumin Urine 100 (A) NEG^Negative mg/dL    Urobilinogen Urine 0.2 0.2 - 1.0 EU/dL    Nitrite Urine Negative NEG^Negative    Leukocyte Esterase Urine Negative NEG^Negative    Source Midstream Urine    Urine Microscopic   Result Value Ref Range    WBC Urine 0 - 5 OTO5^0 - 5 /HPF    RBC Urine 5-10 (A) OTO2^O - 2 /HPF    Hyaline Casts 5-10 (A) OTO2^O - 2 /LPF    Granular Casts 2-5 (A) NEG^Negative /LPF    Squamous Epithelial /LPF Urine Many (A) FEW^Few /LPF    Bacteria Urine Many (A) NEG^Negative /HPF    Mucous Urine Present (A) NEG^Negative /LPF       Medications   0.9% sodium chloride BOLUS (0 mLs Intravenous Stopped 3/8/19 0327)     Followed by   sodium chloride 0.9% infusion (not administered)   prochlorperazine (COMPAZINE) injection 10 mg (10 mg Intravenous Given 3/8/19 0227)   diphenhydrAMINE (BENADRYL) injection 25 mg (25 mg Intravenous Given 3/8/19 0226)   LORazepam (ATIVAN) injection 1 mg (1 mg Intravenous Given 3/8/19 0317)       Assessments & Plan (with Medical Decision Making)        Medication List      ASK your doctor about these medications    omeprazole 20 MG DR capsule  Commonly known as:  priLOSEC  20 mg, Oral, DAILY  Ask  about: Should I take this medication?            Final diagnoses:   Nausea and vomiting, intractability of vomiting not specified, unspecified vomiting type     Patient feels better, I considered abdominal imaging with her given her elevated white count but of course this is nonspecific and the fact that she is feeling much better with just conservative therapy and wants to go home I think that is reasonable.  Return to the ER with localization of pain, fever, recurrence or worsening of pain.  Patient verbalized understanding of plan is in agreement she left the ER in improved condition.  3/8/2019   [facility] Center[facility] hanley MD  03/08/19 5569

## 2019-05-21 ENCOUNTER — OFFICE VISIT (OUTPATIENT)
Dept: OTOLARYNGOLOGY | Facility: OTHER | Age: 44
End: 2019-05-21
Attending: OTOLARYNGOLOGY
Payer: MEDICARE

## 2019-05-21 DIAGNOSIS — Z86.69 HISTORY OF CHRONIC OTITIS MEDIA: Primary | ICD-10-CM

## 2019-05-21 PROCEDURE — G0463 HOSPITAL OUTPT CLINIC VISIT: HCPCS

## 2019-05-31 NOTE — PROGRESS NOTES
document embedded image  Patient Name:  Liliana Rae  YOB: 1975  MR Number:  [id number]  Acct Number: [id number]    cc: ~    ENT Progress Note    Date: 05/21/19    Visit Reasons: 6 Month Ear Recheck    HPI  HPI  HPI: Chief complaint:  Follow-up ear surgery    History  The patient is a 43-year-old female who underwent atticotomy and tympanoplasty on her left ear with T-tube placement for chronic otitis media.  The ears been relatively dry and healthy since her surgery.    Exam  The external auditory canal is clear on the left.  The T-tube is in placed.  There is no active drainage.  There is no inflammation or granulation tissue.  On the right, the canal and tympanic membrane are healthy  The remainder of the head neck exam is unchanged    Home Medications    No Home Medications  05/22/19 [History Confirmed 05/22/19]    Allergies    hazelnut Adverse Reaction (Unknown, Unverified 05/22/19 10:46)  Unknown  latex Adverse Reaction (Unknown, Unverified 05/22/19 10:46)  Unknown  sulindac Adverse Reaction (Unknown, Unverified 05/22/19 10:46)  Unknown    PFSH  PFSH  PFSH:   Medical History (Reviewed 05/22/19 @ 10:47 by Angela Leonard [facility])    Diagnosis unknown (Acute)  Depression  Diabetes  Dizziness  Ear pressure  Hearing loss  Thyroid disorder  Tinnitus  Headache (Acute)      Family History  Father     No problems noted.    Mother     No problems noted.    Brother     No problems noted.    Sister     No problems noted.        Social History  Smoking Status:  Former smoker   second hand exposure:  Yes        A&P  Assessment & Plan  (1) Hx of chronic otitis media:         I would like to check her in 1 year.  She will call if she develops      Paco Izquierdo MD              05/21/19 9653   <Electronically signed by Paco Izquierdo MD> 05/28/19 7530

## 2019-12-07 ENCOUNTER — HOSPITAL ENCOUNTER (EMERGENCY)
Facility: OTHER | Age: 44
Discharge: HOME | End: 2019-12-07
Attending: EMERGENCY MEDICINE | Admitting: EMERGENCY MEDICINE
Payer: MEDICARE

## 2019-12-07 VITALS
TEMPERATURE: 99 F | BODY MASS INDEX: 36.36 KG/M2 | HEART RATE: 103 BPM | OXYGEN SATURATION: 96 % | RESPIRATION RATE: 22 BRPM | SYSTOLIC BLOOD PRESSURE: 136 MMHG | WEIGHT: 180 LBS | DIASTOLIC BLOOD PRESSURE: 79 MMHG

## 2019-12-07 DIAGNOSIS — F41.9 ANXIETY: ICD-10-CM

## 2019-12-07 DIAGNOSIS — R73.9 HYPERGLYCEMIA: ICD-10-CM

## 2019-12-07 LAB
ALBUMIN UR-MCNC: ABNORMAL MG/DL
APPEARANCE UR: CLEAR
BILIRUB UR QL STRIP: NEGATIVE
COLOR UR AUTO: YELLOW
GLUCOSE UR STRIP-MCNC: >1000 MG/DL
HGB UR QL STRIP: NEGATIVE
KETONES UR STRIP-MCNC: NEGATIVE MG/DL
LEUKOCYTE ESTERASE UR QL STRIP: NEGATIVE
NITRATE UR QL: NEGATIVE
PH UR STRIP: 6.5 PH (ref 5–9)
RBC #/AREA URNS AUTO: NORMAL /HPF
SOURCE: ABNORMAL
SP GR UR STRIP: 1.01 (ref 1–1.03)
UROBILINOGEN UR STRIP-ACNC: 0.2 EU/DL (ref 0.2–1)
WBC #/AREA URNS AUTO: NORMAL /HPF

## 2019-12-07 PROCEDURE — 99284 EMERGENCY DEPT VISIT MOD MDM: CPT | Mod: Z6 | Performed by: EMERGENCY MEDICINE

## 2019-12-07 PROCEDURE — 93005 ELECTROCARDIOGRAM TRACING: CPT | Performed by: EMERGENCY MEDICINE

## 2019-12-07 PROCEDURE — 25000132 ZZH RX MED GY IP 250 OP 250 PS 637: Mod: GY | Performed by: EMERGENCY MEDICINE

## 2019-12-07 PROCEDURE — 93010 ELECTROCARDIOGRAM REPORT: CPT | Performed by: INTERNAL MEDICINE

## 2019-12-07 PROCEDURE — 99285 EMERGENCY DEPT VISIT HI MDM: CPT | Performed by: EMERGENCY MEDICINE

## 2019-12-07 PROCEDURE — 96372 THER/PROPH/DIAG INJ SC/IM: CPT | Performed by: EMERGENCY MEDICINE

## 2019-12-07 PROCEDURE — 81001 URINALYSIS AUTO W/SCOPE: CPT | Performed by: EMERGENCY MEDICINE

## 2019-12-07 PROCEDURE — 25000131 ZZH RX MED GY IP 250 OP 636 PS 637: Mod: GY | Performed by: EMERGENCY MEDICINE

## 2019-12-07 RX ORDER — LORAZEPAM 1 MG/1
1 TABLET ORAL ONCE
Status: COMPLETED | OUTPATIENT
Start: 2019-12-07 | End: 2019-12-07

## 2019-12-07 RX ORDER — ACETAMINOPHEN 500 MG
1000 TABLET ORAL ONCE
Status: COMPLETED | OUTPATIENT
Start: 2019-12-07 | End: 2019-12-07

## 2019-12-07 RX ORDER — NICOTINE POLACRILEX 4 MG
15-30 LOZENGE BUCCAL
Status: DISCONTINUED | OUTPATIENT
Start: 2019-12-07 | End: 2019-12-07 | Stop reason: HOSPADM

## 2019-12-07 RX ORDER — DEXTROSE MONOHYDRATE 25 G/50ML
25-50 INJECTION, SOLUTION INTRAVENOUS
Status: DISCONTINUED | OUTPATIENT
Start: 2019-12-07 | End: 2019-12-07 | Stop reason: HOSPADM

## 2019-12-07 RX ADMIN — ACETAMINOPHEN 1000 MG: 500 TABLET, FILM COATED ORAL at 08:05

## 2019-12-07 RX ADMIN — INSULIN DETEMIR 42 UNITS: 100 INJECTION, SOLUTION SUBCUTANEOUS at 07:58

## 2019-12-07 RX ADMIN — LORAZEPAM 1 MG: 1 TABLET ORAL at 07:58

## 2019-12-07 ASSESSMENT — ENCOUNTER SYMPTOMS
LIGHT-HEADEDNESS: 0
VOMITING: 0
ARTHRALGIAS: 0
CHILLS: 0
DYSURIA: 0
NAUSEA: 0
SHORTNESS OF BREATH: 1
AGITATION: 0
FEVER: 0

## 2019-12-07 NOTE — ED TRIAGE NOTES
Patient presents to the ED via EMS with complaints of SOB and tachycardia.  Per EMS patient has a hx of panic attacks.  On arrival EMS states heart rate was 130-140's.  No interventions by EMS prior to arrival. On arrival patients heart rate is 122, patient states SOB is partially relieved at this time.

## 2019-12-07 NOTE — ED NOTES
House supervisor notified that patient does not have a ride home.  Patient states she does not have any money for a ride, nor does she have anyone to call.  [employer] called.
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
## 2019-12-07 NOTE — ED AVS SNAPSHOT
[facility]  [address]  [place] [address]  Phone:  [phone]  Fax:  [phone]                                    Liliana Yao   MRN: [id number]    Department:  [facility]   Date of Visit:  12/7/2019           After Visit Summary Signature Page    I have received my discharge instructions, and my questions have been answered. I have discussed any challenges I see with this plan with the nurse or doctor.    ..........................................................................................................................................  Patient/Patient Representative Signature      ..........................................................................................................................................  Patient Representative Print Name and Relationship to Patient    ..................................................               ................................................  Date                                   Time    ..........................................................................................................................................  Reviewed by Signature/Title    ...................................................              ..............................................  Date                                               Time          22EPIC Rev 08/18

## 2020-02-24 ENCOUNTER — TELEPHONE (OUTPATIENT)
Dept: BEHAVIORAL HEALTH | Facility: CLINIC | Age: 45
End: 2020-02-24

## 2020-02-24 ENCOUNTER — HOSPITAL ENCOUNTER (EMERGENCY)
Facility: OTHER | Age: 45
Discharge: HOME | End: 2020-02-24
Attending: FAMILY MEDICINE | Admitting: FAMILY MEDICINE
Payer: MEDICARE

## 2020-02-24 VITALS
BODY MASS INDEX: 35.35 KG/M2 | OXYGEN SATURATION: 99 % | SYSTOLIC BLOOD PRESSURE: 136 MMHG | WEIGHT: 175 LBS | RESPIRATION RATE: 20 BRPM | HEART RATE: 104 BPM | TEMPERATURE: 97.6 F | DIASTOLIC BLOOD PRESSURE: 77 MMHG

## 2020-02-24 DIAGNOSIS — F41.1 GENERALIZED ANXIETY DISORDER: ICD-10-CM

## 2020-02-24 DIAGNOSIS — N30.00 ACUTE CYSTITIS WITHOUT HEMATURIA: ICD-10-CM

## 2020-02-24 LAB
ALBUMIN UR-MCNC: 30 MG/DL
AMPHETAMINES UR QL SCN: NOT DETECTED
APPEARANCE UR: ABNORMAL
BACTERIA #/AREA URNS HPF: ABNORMAL /HPF
BARBITURATES UR QL: NOT DETECTED
BENZODIAZ UR QL: NOT DETECTED
BILIRUB UR QL STRIP: NEGATIVE
BUPRENORPHINE UR QL: NOT DETECTED NG/ML
CANNABINOIDS UR QL: NOT DETECTED NG/ML
COCAINE UR QL: NOT DETECTED
COLOR UR AUTO: ABNORMAL
D-METHAMPHET UR QL: NOT DETECTED NG/ML
GLUCOSE UR STRIP-MCNC: NEGATIVE MG/DL
HCG UR QL: NEGATIVE
HGB UR QL STRIP: NEGATIVE
HYALINE CASTS #/AREA URNS LPF: 5 /LPF (ref 0–2)
KETONES UR STRIP-MCNC: 10 MG/DL
LEUKOCYTE ESTERASE UR QL STRIP: ABNORMAL
METHADONE UR QL SCN: NOT DETECTED
MUCOUS THREADS #/AREA URNS LPF: PRESENT /LPF
NITRATE UR QL: NEGATIVE
OPIATES UR QL SCN: NOT DETECTED
OXYCODONE UR QL: NOT DETECTED NG/ML
PCP UR QL SCN: NOT DETECTED
PH UR STRIP: 5.5 PH (ref 5–7)
PROPOXYPH UR QL: NOT DETECTED NG/ML
RBC #/AREA URNS AUTO: 5 /HPF (ref 0–2)
SOURCE: ABNORMAL
SP GR UR STRIP: 1.01 (ref 1–1.03)
SQUAMOUS #/AREA URNS AUTO: 16 /HPF (ref 0–1)
TRICYCLICS UR QL SCN: NOT DETECTED NG/ML
TSH SERPL DL<=0.05 MIU/L-ACNC: 0.65 IU/ML (ref 0.34–5.6)
UROBILINOGEN UR STRIP-MCNC: NORMAL MG/DL (ref 0–2)
WBC #/AREA URNS AUTO: 9 /HPF (ref 0–5)

## 2020-02-24 PROCEDURE — 84443 ASSAY THYROID STIM HORMONE: CPT | Performed by: FAMILY MEDICINE

## 2020-02-24 PROCEDURE — 87086 URINE CULTURE/COLONY COUNT: CPT | Performed by: FAMILY MEDICINE

## 2020-02-24 PROCEDURE — 99285 EMERGENCY DEPT VISIT HI MDM: CPT | Mod: Z6 | Performed by: FAMILY MEDICINE

## 2020-02-24 PROCEDURE — 80307 DRUG TEST PRSMV CHEM ANLYZR: CPT | Performed by: FAMILY MEDICINE

## 2020-02-24 PROCEDURE — 36415 COLL VENOUS BLD VENIPUNCTURE: CPT | Performed by: FAMILY MEDICINE

## 2020-02-24 PROCEDURE — 81025 URINE PREGNANCY TEST: CPT | Performed by: FAMILY MEDICINE

## 2020-02-24 PROCEDURE — 25000132 ZZH RX MED GY IP 250 OP 250 PS 637: Mod: GY | Performed by: FAMILY MEDICINE

## 2020-02-24 PROCEDURE — 81001 URINALYSIS AUTO W/SCOPE: CPT | Performed by: FAMILY MEDICINE

## 2020-02-24 PROCEDURE — 99285 EMERGENCY DEPT VISIT HI MDM: CPT | Performed by: FAMILY MEDICINE

## 2020-02-24 RX ORDER — SULFAMETHOXAZOLE/TRIMETHOPRIM 800-160 MG
1 TABLET ORAL 2 TIMES DAILY
Qty: 6 TABLET | Refills: 0 | Status: SHIPPED | OUTPATIENT
Start: 2020-02-24 | End: 2020-02-27

## 2020-02-24 RX ORDER — LORAZEPAM 1 MG/1
1 TABLET ORAL ONCE
Status: DISCONTINUED | OUTPATIENT
Start: 2020-02-24 | End: 2020-02-24 | Stop reason: HOSPADM

## 2020-02-24 RX ORDER — SULFAMETHOXAZOLE/TRIMETHOPRIM 800-160 MG
1 TABLET ORAL ONCE
Status: COMPLETED | OUTPATIENT
Start: 2020-02-24 | End: 2020-02-24

## 2020-02-24 RX ADMIN — SULFAMETHOXAZOLE AND TRIMETHOPRIM 1 TABLET: 800; 160 TABLET ORAL at 20:19
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
## 2020-02-24 NOTE — ED NOTES
Pt notified that the DEC assessment will be around 1500. Pt requested to have her blood sugar checked.   Chem 205
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
## 2020-02-24 NOTE — ED NOTES
DEC provider called, [facility] intake does not think patient meets admission status. Patient is to go to crisis bed within town for additional help. Md and patient agreed with this plan

## 2020-02-24 NOTE — ED NOTES
Patient refusing to change into blue scrubs. Security with metal wand, patient tolerated. Patient on 30 minutes check. DEC consulted.

## 2020-02-24 NOTE — ED AVS SNAPSHOT
[facility]  [address]  [place] [address]  Phone:  [phone]  Fax:  [phone]                                    Liliana Yao   MRN: [id number]    Department:  [facility]   Date of Visit:  2/24/2020           After Visit Summary Signature Page    I have received my discharge instructions, and my questions have been answered. I have discussed any challenges I see with this plan with the nurse or doctor.    ..........................................................................................................................................  Patient/Patient Representative Signature      ..........................................................................................................................................  Patient Representative Print Name and Relationship to Patient    ..................................................               ................................................  Date                                   Time    ..........................................................................................................................................  Reviewed by Signature/Title    ...................................................              ..............................................  Date                                               Time          22EPIC Rev 08/18

## 2020-02-24 NOTE — ED NOTES
DEC provider called, recommending inpatient treatment. MD aware. Patient to remain on 72 hr hold, and sitter started.

## 2020-02-26 LAB
BACTERIA SPEC CULT: NORMAL
SPECIMEN SOURCE: NORMAL

## 2020-02-26 NOTE — RESULT ENCOUNTER NOTE
Final urine culture report is NEGATIVE per [facility] ED Lab Result protocol.    If NEGATIVE result, no change in treatment, per [facility] ED Lab Result protocol.

## 2022-03-17 ENCOUNTER — TELEPHONE (OUTPATIENT)
Dept: OTOLARYNGOLOGY | Facility: OTHER | Age: 47
End: 2022-03-17

## 2022-03-17 NOTE — TELEPHONE ENCOUNTER
3/17/22    Referral received from [facility]. Left message to schedule with Audio/ENT Dept.    -Alice BEATTY  [facility]  EXT. [phone]

## 2022-03-18 ENCOUNTER — TELEPHONE (OUTPATIENT)
Dept: OTOLARYNGOLOGY | Facility: OTHER | Age: 47
End: 2022-03-18

## 2022-03-21 DIAGNOSIS — H91.93 DECREASED HEARING OF BOTH EARS: Primary | ICD-10-CM

## 2022-04-20 PROBLEM — F32.A DEPRESSION: Status: ACTIVE | Noted: 2022-04-20

## 2022-04-20 PROBLEM — F41.0 PANIC ATTACKS: Status: ACTIVE | Noted: 2020-01-31

## 2022-04-20 PROBLEM — E06.3 HASHIMOTO'S THYROIDITIS: Status: ACTIVE | Noted: 2019-03-13

## 2022-04-20 PROBLEM — E66.01 MORBID OBESITY WITH BMI OF 40.0-44.9, ADULT (H): Status: ACTIVE | Noted: 2019-12-06

## 2022-04-27 ENCOUNTER — TELEPHONE (OUTPATIENT)
Dept: OTOLARYNGOLOGY | Facility: OTHER | Age: 47
End: 2022-04-27

## 2022-04-27 ENCOUNTER — OFFICE VISIT (OUTPATIENT)
Dept: AUDIOLOGY | Facility: OTHER | Age: 47
End: 2022-04-27
Attending: NURSE PRACTITIONER
Payer: MEDICARE

## 2022-04-27 ENCOUNTER — OFFICE VISIT (OUTPATIENT)
Dept: OTOLARYNGOLOGY | Facility: OTHER | Age: 47
End: 2022-04-27
Attending: NURSE PRACTITIONER
Payer: MEDICARE

## 2022-04-27 VITALS
SYSTOLIC BLOOD PRESSURE: 134 MMHG | DIASTOLIC BLOOD PRESSURE: 72 MMHG | OXYGEN SATURATION: 97 % | HEIGHT: 59 IN | TEMPERATURE: 97.4 F | HEART RATE: 101 BPM | WEIGHT: 207 LBS | BODY MASS INDEX: 41.73 KG/M2

## 2022-04-27 DIAGNOSIS — H90.6 MIXED CONDUCTIVE AND SENSORINEURAL HEARING LOSS OF BOTH EARS: Primary | ICD-10-CM

## 2022-04-27 DIAGNOSIS — H73.92 TYMPANIC MEMBRANE IRRITATION, LEFT: ICD-10-CM

## 2022-04-27 DIAGNOSIS — H74.41 AURAL POLYP OF MIDDLE EAR, RIGHT: Primary | ICD-10-CM

## 2022-04-27 DIAGNOSIS — H69.93 DYSFUNCTION OF EUSTACHIAN TUBE, BILATERAL: ICD-10-CM

## 2022-04-27 DIAGNOSIS — H91.93 DECREASED HEARING OF BOTH EARS: ICD-10-CM

## 2022-04-27 PROCEDURE — G0463 HOSPITAL OUTPT CLINIC VISIT: HCPCS

## 2022-04-27 PROCEDURE — 99213 OFFICE O/P EST LOW 20 MIN: CPT | Mod: 25 | Performed by: NURSE PRACTITIONER

## 2022-04-27 PROCEDURE — 92504 EAR MICROSCOPY EXAMINATION: CPT | Performed by: NURSE PRACTITIONER

## 2022-04-27 PROCEDURE — 69210 REMOVE IMPACTED EAR WAX UNI: CPT | Performed by: NURSE PRACTITIONER

## 2022-04-27 PROCEDURE — 92557 COMPREHENSIVE HEARING TEST: CPT | Performed by: AUDIOLOGIST

## 2022-04-27 PROCEDURE — 92567 TYMPANOMETRY: CPT | Performed by: AUDIOLOGIST

## 2022-04-27 RX ORDER — ARIPIPRAZOLE 5 MG/1
5 TABLET ORAL DAILY
COMMUNITY

## 2022-04-27 RX ORDER — CIPROFLOXACIN AND DEXAMETHASONE 3; 1 MG/ML; MG/ML
4 SUSPENSION/ DROPS AURICULAR (OTIC) 2 TIMES DAILY
Qty: 5.6 ML | Refills: 0 | Status: SHIPPED | OUTPATIENT
Start: 2022-04-27 | End: 2022-04-27 | Stop reason: ALTCHOICE

## 2022-04-27 RX ORDER — SULFACETAMIDE SODIUM AND PREDNISOLONE SODIUM PHOSPHATE 100; 2.3 MG/ML; MG/ML
5 SOLUTION/ DROPS OPHTHALMIC 2 TIMES DAILY
Qty: 7 ML | Refills: 0 | Status: SHIPPED | OUTPATIENT
Start: 2022-04-27 | End: 2022-05-11

## 2022-04-27 ASSESSMENT — PAIN SCALES - GENERAL: PAINLEVEL: MODERATE PAIN (5)

## 2022-04-27 NOTE — PROGRESS NOTES
Audiology Evaluation Completed. Please refer SCANNED AUDIOGRAM and/or TYMPANOGRAM for BACKGROUND, RESULTS, RECOMMENDATIONS.      Lubna ARANGO, [facility]  Audiologist #[id number]

## 2022-04-27 NOTE — PATIENT INSTRUCTIONS
Thank you for allowing Kathy Wong NP and our ENT team to participate in your care.  If your medications are too expensive, please give the nurse a call.  We can possibly change this medication.  If you have a scheduling or an appointment question please contact our Health Unit Coordinator at their direct line [phone].  ALL nursing questions or concerns can be directed to your ENT Nurse: [phone]--Fernanda    ciprofloxacin-dexamethasone (CIPRODEX) 0.3-0.1 % otic suspension, Place 4 drops into both ears 2 times daily for 14 days    Follow up in 2 weeks with Kathy Wong to recheck ears.  If ears are not getting better then we will order a CT scan.     Need to get records from Dr. Izquierdo

## 2022-04-27 NOTE — LETTER
4/27/2022         RE: Liliana Yao  [address]  [facility] [address]        Dear Colleague,    Thank you for referring your patient, Liliana Yao, to the [facility]. Please see a copy of my visit note below.    Otolaryngology Note         Chief Complaint:     Patient presents with:  Cerumen Impaction: Ear cleaning            History of Present Illness:     Liliana Yao is a 46 year old female seen today for ear cleaning and audiogram.    She feels that her hearing has worsened since covid, she did have some degree of hearing loss prior to covid.    Tinnitus - intermittent, not real bothersome - causes decreased hearing    Former smoker, quit 1997    + family hx of hearing loss, mom had perforation with decreased hearing.  Brother has a history of ear surgery ? tympanoplasty  No current concerns with otalgia, otorrhea.    She has a history of noise exposure, worked at [employer] - wore hearing protection.   She has some vertigo with getting up too fast, happens several times per day and lasts seconds.  She denies vertigo turning over in bed.  NO, facial numbness or tingling.      She reports a history of a polyp removal from the left ear about 3 years ago completed by Dr Izquierdo.  She had improvement in hearing after surgery.     She reports 2 weeks ago she had bloody otorrhea with associated pain on the right.  She didn't seek medical care.  The bleeding lasted for just one episode.  The pain lasted a few days, and she has noted decreased hearing on the right.      NO further pain today.  She feels otorrhea in her ear but has not noted any celine drainage    No preceding URI.      She has a history Pe tubes as a child due to frequent ear infections.      Last audiogram was about 4 years ago with Dr Izquierdo, this not on file at this time.      Audiogram completed today:  Tympanograms are Type AS right ear and TYpe B left ear-volume left is larger than right.  Acoustic Reflex  Thresholds at 1000 Hz are absent for both ears.  Thresholds are using inserts(narrow canals) and TDH: mild left sloping to moderate-severe and right mild rising to normal sloping to  severe-mixeed loss both ears.  Speech reception thresholds are in good agreement with pure tone average.  Word discrimination scores are excellent at supra-thresholds level.         Medications:     Current Outpatient Rx   Medication Sig Dispense Refill     ARIPiprazole (ABILIFY) 5 MG tablet Take 5 mg by mouth daily       blood glucose (NO BRAND SPECIFIED) test strip As directed. Dispense test strips covered by the patient insurance. Test 3-4 times per day.       escitalopram (LEXAPRO) 20 MG tablet Take 20 mg by mouth daily       glucagon 1 MG kit Inject 1 mg into the muscle       insulin detemir (LEVEMIR) 100 UNIT/ML injection Take 35 units subcutaneous in the morning and 35 units subcutaneous in the evening       insulin glargine U-300 (TOUJEO) 300 UNIT/ML (1 units dial) pen Inject 72 Units Subcutaneous At Bedtime       Insulin Pen Needle (PEN NEEDLES) 29G X 12MM MISC As directed. 31 G x 6 MM . Use with Victoza to inject subcu once daily       levothyroxine (SYNTHROID/LEVOTHROID) 75 MCG tablet Take 75 mcg by mouth daily       lisinopril (PRINIVIL/ZESTRIL) 10 MG tablet Take 10 mg by mouth daily       metFORMIN (GLUCOPHAGE) 500 MG tablet Take 1,000 mg by mouth 2 times daily (with meals)       simvastatin (ZOCOR) 20 MG tablet Take 20 mg by mouth daily       sulfacetamide-prednisoLONE (VASOCIDIN) 10-0.23 % ophthalmic solution Place 5 drops into both ears 2 times daily for 14 days 7 mL 0            Allergies:     Allergies: Acetone, Hazelnuts [nuts], and Sulindac          Past Medical History:     Past Medical History:   Diagnosis Date     Allergic rhinitis     7/23/2010     Coagulation defect (H)     She has cognitive delay     Diarrhea     Diarrhea since 03/06     Encounter for other general examination (CODE)     Foot exam negative.   "The patient is due for her annual eye exam.     Obesity     Morbid obesity, noncompliant     Otitis media     2012     Personal history of other mental and behavioral disorders     none in last 10 years     Sebaceous cyst     Right palmar ganglion cyst     Torticollis     She has recurrent wryneck     Uncomplicated asthma     No Comments Provided            Past Surgical History:     Past Surgical History:   Procedure Laterality Date     EXCISE CYST GENERIC (LOCATION)      The patient had right ganglion cyst removal.     TYMPANOSTOMY, LOCAL/TOPICAL ANESTHESIA      PE tubes at age 4 & 24     TYMPANOSTOMY, LOCAL/TOPICAL ANESTHESIA      2008,Tube placement by Dr. Paco Izquierdo       ENT family history reviewed         Social History:     Social History     Tobacco Use     Smoking status: Former Smoker     Packs/day: 0.50     Years: 12.00     Pack years: 6.00     Types: Cigarettes     Quit date: 1997     Years since quittin.6     Smokeless tobacco: Never Used   Substance Use Topics     Alcohol use: Yes     Alcohol/week: 1.0 standard drink     Comment: Alcoholic Drinks/day: 1 per month     Drug use: Unknown     Types: Other     Comment: Drug use: No            Review of Systems:     ROS: See HPI         Physical Exam:     /72 (Cuff Size: Adult Large)   Pulse 101   Temp 97.4  F (36.3  C) (Tympanic)   Ht 1.499 m (4' 11\")   Wt 93.9 kg (207 lb)   SpO2 97%   BMI 41.81 kg/m      General - The patient is well nourished and well developed, and appears to have good nutritional status.  Alert and oriented to person and place, answers questions and cooperates with examination appropriately.   Head and Face - Normocephalic and atraumatic, with no gross asymmetry noted.  The facial nerve is intact, with strong symmetric movements.  Voice and Breathing - The patient was breathing comfortably without the use of accessory muscles. There was no wheezing, stridor. The patients voice was clear and strong, and " had appropriate pitch and quality.  Ears - the ears were examined under binocular microscopy and with otoscope.  Bilateral ceruminosis.  The ears were cleaned with suction and cerumen loop. The right TM is intact with mild polypoid change.  No otorrhea.  The left TM is intact with surgical changes.   eyes - Extraocular movements intact, sclera were not icteric or injected, conjunctiva were pink and moist.  Mouth - Examination of the oral cavity showed pink, healthy oral mucosa.  No lesions or ulcerations noted. The tongue was mobile and midline.   Throat - The walls of the oropharynx were smooth, pink, moist, symmetric, and had no lesions or ulcerations.  The tonsillar pillars and soft palate were symmetric. The uvula was midline on elevation.    Neck - no palpable worrisome lymphadenopathy.  Palpation of the thyroid was soft and smooth, with no nodules or goiter appreciated.  The trachea was mobile and midline.  Nose - External contour is symmetric, no gross deflection or scars.  Nasal mucosa is pink and moist with no abnormal mucus.           Assessment and Plan:       ICD-10-CM    1. Aural polyp of middle ear, right  H74.41 DISCONTINUED: ciprofloxacin-dexamethasone (CIPRODEX) 0.3-0.1 % otic suspension   2. Tympanic membrane irritation, left  H73.92 DISCONTINUED: ciprofloxacin-dexamethasone (CIPRODEX) 0.3-0.1 % otic suspension       ciprofloxacin-dexamethasone (CIPRODEX) 0.3-0.1 % otic suspension, Place 4 drops into both ears 2 times daily for 14 days    Follow up in 2 weeks with Kathy Wong to recheck ears.  If ears are not getting better then we will order a CT scan.     We will obtain previous records from Dr. Izquierdo including audiogram.    Kathy HARGROVE  [facility] ENT        Again, thank you for allowing me to participate in the care of your patient.        Sincerely,        Kathy Wong NP

## 2022-04-27 NOTE — TELEPHONE ENCOUNTER
Pharmacist called to see if we can change the prescription from Ciprodex to Neomycin, Polymycin and hydrocortison drops as these are covered

## 2022-04-27 NOTE — NURSING NOTE
"Chief Complaint   Patient presents with     Cerumen Impaction     Ear cleaning        Initial /72 (Cuff Size: Adult Large)   Pulse 101   Temp 97.4  F (36.3  C) (Tympanic)   Ht 1.499 m (4' 11\")   Wt 93.9 kg (207 lb)   SpO2 97%   BMI 41.81 kg/m   Estimated body mass index is 41.81 kg/m  as calculated from the following:    Height as of this encounter: 1.499 m (4' 11\").    Weight as of this encounter: 93.9 kg (207 lb).  Medication Reconciliation: complete  Angelika Galvin LPN    "

## 2022-04-27 NOTE — PROGRESS NOTES
Otolaryngology Note         Chief Complaint:     Patient presents with:  Cerumen Impaction: Ear cleaning            History of Present Illness:     Liliana Yao is a 46 year old female seen today for ear cleaning and audiogram.    She feels that her hearing has worsened since covid, she did have some degree of hearing loss prior to covid.    Tinnitus - intermittent, not real bothersome - causes decreased hearing    Former smoker, quit 1997    + family hx of hearing loss, mom had perforation with decreased hearing.  Brother has a history of ear surgery ? tympanoplasty  No current concerns with otalgia, otorrhea.    She has a history of noise exposure, worked at [employer] - wore hearing protection.   She has some vertigo with getting up too fast, happens several times per day and lasts seconds.  She denies vertigo turning over in bed.  NO, facial numbness or tingling.      She reports a history of a polyp removal from the left ear about 3 years ago completed by Dr Izquierdo.  She had improvement in hearing after surgery.     She reports 2 weeks ago she had bloody otorrhea with associated pain on the right.  She didn't seek medical care.  The bleeding lasted for just one episode.  The pain lasted a few days, and she has noted decreased hearing on the right.      NO further pain today.  She feels otorrhea in her ear but has not noted any celine drainage    No preceding URI.      She has a history Pe tubes as a child due to frequent ear infections.      Last audiogram was about 4 years ago with Dr Izquierdo, this not on file at this time.      Audiogram completed today:  Tympanograms are Type AS right ear and TYpe B left ear-volume left is larger than right.  Acoustic Reflex Thresholds at 1000 Hz are absent for both ears.  Thresholds are using inserts(narrow canals) and TDH: mild left sloping to moderate-severe and right mild rising to normal sloping to  severe-mixeed loss both ears.  Speech reception thresholds are in good  agreement with pure tone average.  Word discrimination scores are excellent at supra-thresholds level.         Medications:     Current Outpatient Rx   Medication Sig Dispense Refill     ARIPiprazole (ABILIFY) 5 MG tablet Take 5 mg by mouth daily       blood glucose (NO BRAND SPECIFIED) test strip As directed. Dispense test strips covered by the patient insurance. Test 3-4 times per day.       escitalopram (LEXAPRO) 20 MG tablet Take 20 mg by mouth daily       glucagon 1 MG kit Inject 1 mg into the muscle       insulin detemir (LEVEMIR) 100 UNIT/ML injection Take 35 units subcutaneous in the morning and 35 units subcutaneous in the evening       insulin glargine U-300 (TOUJEO) 300 UNIT/ML (1 units dial) pen Inject 72 Units Subcutaneous At Bedtime       Insulin Pen Needle (PEN NEEDLES) 29G X 12MM MISC As directed. 31 G x 6 MM . Use with Victoza to inject subcu once daily       levothyroxine (SYNTHROID/LEVOTHROID) 75 MCG tablet Take 75 mcg by mouth daily       lisinopril (PRINIVIL/ZESTRIL) 10 MG tablet Take 10 mg by mouth daily       metFORMIN (GLUCOPHAGE) 500 MG tablet Take 1,000 mg by mouth 2 times daily (with meals)       simvastatin (ZOCOR) 20 MG tablet Take 20 mg by mouth daily       sulfacetamide-prednisoLONE (VASOCIDIN) 10-0.23 % ophthalmic solution Place 5 drops into both ears 2 times daily for 14 days 7 mL 0            Allergies:     Allergies: Acetone, Hazelnuts [nuts], and Sulindac          Past Medical History:     Past Medical History:   Diagnosis Date     Allergic rhinitis     7/23/2010     Coagulation defect (H)     She has cognitive delay     Diarrhea     Diarrhea since 03/06     Encounter for other general examination (CODE)     Foot exam negative.  The patient is due for her annual eye exam.     Obesity     Morbid obesity, noncompliant     Otitis media     4/4/2012     Personal history of other mental and behavioral disorders     none in last 10 years     Sebaceous cyst     Right palmar ganglion cyst  "    Torticollis     She has recurrent wryneck     Uncomplicated asthma     No Comments Provided            Past Surgical History:     Past Surgical History:   Procedure Laterality Date     EXCISE CYST GENERIC (LOCATION)      The patient had right ganglion cyst removal.     TYMPANOSTOMY, LOCAL/TOPICAL ANESTHESIA      PE tubes at age 4 & 24     TYMPANOSTOMY, LOCAL/TOPICAL ANESTHESIA      2008,Tube placement by Dr. Paco Izquierdo       ENT family history reviewed         Social History:     Social History     Tobacco Use     Smoking status: Former Smoker     Packs/day: 0.50     Years: 12.00     Pack years: 6.00     Types: Cigarettes     Quit date: 1997     Years since quittin.6     Smokeless tobacco: Never Used   Substance Use Topics     Alcohol use: Yes     Alcohol/week: 1.0 standard drink     Comment: Alcoholic Drinks/day: 1 per month     Drug use: Unknown     Types: Other     Comment: Drug use: No            Review of Systems:     ROS: See HPI         Physical Exam:     /72 (Cuff Size: Adult Large)   Pulse 101   Temp 97.4  F (36.3  C) (Tympanic)   Ht 1.499 m (4' 11\")   Wt 93.9 kg (207 lb)   SpO2 97%   BMI 41.81 kg/m      General - The patient is well nourished and well developed, and appears to have good nutritional status.  Alert and oriented to person and place, answers questions and cooperates with examination appropriately.   Head and Face - Normocephalic and atraumatic, with no gross asymmetry noted.  The facial nerve is intact, with strong symmetric movements.  Voice and Breathing - The patient was breathing comfortably without the use of accessory muscles. There was no wheezing, stridor. The patients voice was clear and strong, and had appropriate pitch and quality.  Ears - the ears were examined under binocular microscopy and with otoscope.  Bilateral ceruminosis.  The ears were cleaned with suction and cerumen loop. The right TM is intact with mild polypoid change.  No otorrhea.  " The left TM is intact with surgical changes.   eyes - Extraocular movements intact, sclera were not icteric or injected, conjunctiva were pink and moist.  Mouth - Examination of the oral cavity showed pink, healthy oral mucosa.  No lesions or ulcerations noted. The tongue was mobile and midline.   Throat - The walls of the oropharynx were smooth, pink, moist, symmetric, and had no lesions or ulcerations.  The tonsillar pillars and soft palate were symmetric. The uvula was midline on elevation.    Neck - no palpable worrisome lymphadenopathy.  Palpation of the thyroid was soft and smooth, with no nodules or goiter appreciated.  The trachea was mobile and midline.  Nose - External contour is symmetric, no gross deflection or scars.  Nasal mucosa is pink and moist with no abnormal mucus.           Assessment and Plan:       ICD-10-CM    1. Aural polyp of middle ear, right  H74.41 DISCONTINUED: ciprofloxacin-dexamethasone (CIPRODEX) 0.3-0.1 % otic suspension   2. Tympanic membrane irritation, left  H73.92 DISCONTINUED: ciprofloxacin-dexamethasone (CIPRODEX) 0.3-0.1 % otic suspension       ciprofloxacin-dexamethasone (CIPRODEX) 0.3-0.1 % otic suspension, Place 4 drops into both ears 2 times daily for 14 days    Follow up in 2 weeks with Kathy Wong to recheck ears.  If ears are not getting better then we will order a CT scan.     We will obtain previous records from Dr. Izquierdo including audiogram.    Kathy HARGROVE  [facility] ENT
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
## 2023-03-24 ENCOUNTER — HOSPITAL ENCOUNTER (OUTPATIENT)
Dept: MRI IMAGING | Facility: OTHER | Age: 48
Discharge: HOME | End: 2023-03-24
Attending: FAMILY MEDICINE | Admitting: FAMILY MEDICINE
Payer: COMMERCIAL

## 2023-03-24 DIAGNOSIS — M75.42 ROTATOR CUFF IMPINGEMENT SYNDROME OF LEFT SHOULDER: ICD-10-CM

## 2023-03-24 PROCEDURE — 73221 MRI JOINT UPR EXTREM W/O DYE: CPT | Mod: LT

## 2023-11-02 ENCOUNTER — HOSPITAL ENCOUNTER (EMERGENCY)
Facility: OTHER | Age: 48
Discharge: HOME | End: 2023-11-02
Attending: PHYSICIAN ASSISTANT | Admitting: PHYSICIAN ASSISTANT
Payer: COMMERCIAL

## 2023-11-02 ENCOUNTER — APPOINTMENT (OUTPATIENT)
Dept: GENERAL RADIOLOGY | Facility: OTHER | Age: 48
End: 2023-11-02
Attending: PHYSICIAN ASSISTANT
Payer: COMMERCIAL

## 2023-11-02 VITALS
OXYGEN SATURATION: 97 % | RESPIRATION RATE: 18 BRPM | DIASTOLIC BLOOD PRESSURE: 85 MMHG | HEART RATE: 81 BPM | TEMPERATURE: 98 F | SYSTOLIC BLOOD PRESSURE: 115 MMHG

## 2023-11-02 DIAGNOSIS — R07.89 RIGHT-SIDED CHEST WALL PAIN: ICD-10-CM

## 2023-11-02 PROCEDURE — 71101 X-RAY EXAM UNILAT RIBS/CHEST: CPT | Mod: RT

## 2023-11-02 PROCEDURE — 99284 EMERGENCY DEPT VISIT MOD MDM: CPT | Performed by: PHYSICIAN ASSISTANT

## 2023-11-02 PROCEDURE — 250N000013 HC RX MED GY IP 250 OP 250 PS 637: Performed by: PHYSICIAN ASSISTANT

## 2023-11-02 RX ORDER — HYDROCODONE BITARTRATE AND ACETAMINOPHEN 5; 325 MG/1; MG/1
1 TABLET ORAL EVERY 6 HOURS PRN
Qty: 12 TABLET | Refills: 0 | Status: SHIPPED | OUTPATIENT
Start: 2023-11-02 | End: 2023-11-22

## 2023-11-02 RX ORDER — HYDROXYZINE PAMOATE 25 MG/1
25 CAPSULE ORAL 3 TIMES DAILY PRN
Qty: 15 CAPSULE | Refills: 0 | Status: SHIPPED | OUTPATIENT
Start: 2023-11-02

## 2023-11-02 RX ORDER — HYDROCODONE BITARTRATE AND ACETAMINOPHEN 5; 325 MG/1; MG/1
1 TABLET ORAL ONCE
Status: COMPLETED | OUTPATIENT
Start: 2023-11-02 | End: 2023-11-02

## 2023-11-02 RX ORDER — HYDROXYZINE PAMOATE 25 MG/1
25 CAPSULE ORAL ONCE
Status: COMPLETED | OUTPATIENT
Start: 2023-11-02 | End: 2023-11-02

## 2023-11-02 RX ADMIN — HYDROCODONE BITARTRATE AND ACETAMINOPHEN 1 TABLET: 5; 325 TABLET ORAL at 21:34

## 2023-11-02 RX ADMIN — HYDROXYZINE PAMOATE 25 MG: 25 CAPSULE ORAL at 21:34

## 2023-11-02 ASSESSMENT — ACTIVITIES OF DAILY LIVING (ADL): ADLS_ACUITY_SCORE: 33

## 2023-11-03 NOTE — ED PROVIDER NOTES
EMERGENCY DEPARTMENT ENCOUNTER      NAME: Liliana Yao  AGE: 48 year old female  YOB: 1975  MRN: [id number]  EVALUATION DATE & TIME: 11/2/2023  8:34 PM    PCP: Khushbu Mccormick    ED PROVIDER: Clarke Longoria PA-C       CHIEF COMPLAINT:  Chief Complaint   Patient presents with    Rib Pain       ED COURSE, MEDICAL DECISION MAKING, FINAL IMPRESSION AND PLAN:     The patient was interviewed and examined.  HPI and physical exam as below.  Differential diagnosis and MDM Key Documentation Elements as below.  Vitals and triage note were reviewed.  /85   Pulse 81   Temp 98  F (36.7  C) (Tympanic)   Resp 18   SpO2 97%     Liliana Yao is a pleasant 48 year old female who presents to the ER today for right-sided chest wall pain.  Patient states that she was cleaning the bathroom when she felt a pop in her right side of her chest.  Now having moderate pain localized to the right anterior lateral side of her chest wall.  No shortness of breath.  Pain worse with deep inspirations or movement.  Better with keeping still.  No other complaints.    Differential includes but is not limited to rib fracture, rib contusion, chest wall contusion, muscle strain, pneumothorax, hemothorax    Patient there is afebrile with otherwise normal vitals.  Patient was in no acute distress but discomfort from pain.  Evaluation revealed right anterolateral chest wall tenderness.  No signs of gross deformity.  Lungs are clear.  Heart was regular.  No abdominal, flank, CVA tenderness.  Examination today otherwise benign.  Chest x-ray today unremarkable for signs of acute pneumothorax or rib fracture.    Patient given Norco and Vistaril for pain with improvement in symptoms.  Most likely a muscular strain on physical exam.  Patient is PERC negative, less likely acute PE.  No signs of pneumothorax or hemothorax.  Patient may use ibuprofen, Norco, and Vistaril for pain control.  Patient encouraged to not use constriction.  " Return to the ER for any worsening of symptoms.  Follow-up primary care for any worsening symptoms.    Assessment / Plan:  1. Right-sided chest wall pain          Medications given during today's ER visit:  Medications   HYDROcodone-acetaminophen (NORCO) 5-325 MG per tablet 1 tablet (1 tablet Oral $Given 11/2/23 2134)   hydrOXYzine (VISTARIL) capsule 25 mg (25 mg Oral $Given 11/2/23 2134)       New prescriptions started at today's ER visit  Discharge Medication List as of 11/2/2023  9:33 PM        START taking these medications    Details   HYDROcodone-acetaminophen (NORCO) 5-325 MG tablet Take 1 tablet by mouth every 6 hours as needed for severe pain, Disp-12 tablet, R-0, [facility]      hydrOXYzine (VISTARIL) 25 MG capsule Take 1 capsule (25 mg) by mouth 3 times daily as needed for itching, Disp-15 capsule, R-0, [facility]               Pertinent Labs & Imaging studies reviewed. (See chart for details)  Results for orders placed or performed during the hospital encounter of 11/02/23   XR Ribs & Chest Rt 3vw    Impression    IMPRESSION: Clear chest. No evidence of pneumothorax or rib fracture.    RISHI TOMLIN MD         SYSTEM ID:  [id number]     No results found for: \"ABORH\"      Reassessments, Medications, Interventions, & Response to Treatments:  No adverse effects from Norco or Vistaril.  Pain improved with the use of interventions.  Discussed imaging results.    Consultations:  None    Decision Rules, Medical Calculators, and Risk Stratification Tools:  None    MDM Key Documentation Elements for Patient's Evaluation:  Differential diagnosis to include high risk considerations: As above  Escalation to admission/observation considered: Admission/observation considered, but patient does not meet admission criteria  Discussions and management with other clinicians:    3a. Independent interpretation of testing performed by another health professional:  -No  3b. Discussion of management or test interpretation " with another health professional: -No  Independent interpretation of tests:  Ordering and/or review of 1 test(s)  Discussion of test interpretations with radiology:  No  History obtained from source other than patient or assessment requiring an independent historian:  No  Review of non-ED/external records:  review of 1+ records  Diagnostic tests considered but not ultimately performed/deferred:  -EKG, but easily reproducible chest wall pain  Prescription medications considered but not prescribed:  -Oxycodone, antibiotics  Chronic conditions affecting care:  -None  Care affected by social determinants of health:  -None    The patient's management involved:   - Imaging studies  - Prescription drug management      A shared decision making model was used. Time was taken to answer all questions.  Patient and/or associated parties understood and were agreeable to treatment plan.  Plan and all results were discussed. Warning signs and close return precautions to return to the ED given. Copy of results given. Discharged in stable condition. Discharged with discharge instructions outlining plan for further care and follow up.      PPE worn during patient evaluation:  Mask: Yes  Eye Protection: No  Gown: No  Hair cover: No  Face Shield: No  Patient wearing a mask: No    =================================================================    HPI  Liliana Yao is a pleasant 48 year old female who presents to the ER today for right-sided chest wall pain.  Patient states that she was cleaning the bathroom when she felt a pop in her right side of her chest.  Now having moderate pain localized to the right anterior lateral side of her chest wall.  No shortness of breath.  Pain worse with deep inspirations or movement.  Better with keeping still.  No other complaints.      REVIEW OF SYSTEMS   Review of Systems  As above, otherwise ROS is unremarkable.      PAST MEDICAL HISTORY:  Past Medical History:   Diagnosis Date    Allergic  rhinitis     7/23/2010    Coagulation defect (H24)     She has cognitive delay    Diarrhea     Diarrhea since 03/06    Encounter for other general examination (CODE)     Foot exam negative.  The patient is due for her annual eye exam.    Obesity     Morbid obesity, noncompliant    Otitis media     4/4/2012    Personal history of other mental and behavioral disorders     none in last 10 years    Sebaceous cyst     Right palmar ganglion cyst    Torticollis     She has recurrent wryneck    Uncomplicated asthma     No Comments Provided       PAST SURGICAL HISTORY:  Past Surgical History:   Procedure Laterality Date    EXCISE CYST GENERIC (LOCATION)      The patient had right ganglion cyst removal.    TYMPANOSTOMY, LOCAL/TOPICAL ANESTHESIA      PE tubes at age 4 & 24    TYMPANOSTOMY, LOCAL/TOPICAL ANESTHESIA      07/08/2008,Tube placement by Dr. Paco Izquierdo       CURRENT MEDICATIONS:    Current Outpatient Medications   Medication Instructions    ARIPiprazole (ABILIFY) 5 mg, Oral, DAILY    blood glucose (NO BRAND SPECIFIED) test strip As directed. Dispense test strips covered by the patient insurance. Test 3-4 times per day.    escitalopram (LEXAPRO) 20 mg, Oral, DAILY    glucagon 1 mg, Intramuscular    HYDROcodone-acetaminophen (NORCO) 5-325 MG tablet 1 tablet, Oral, EVERY 6 HOURS PRN    hydrOXYzine (VISTARIL) 25 mg, Oral, 3 TIMES DAILY PRN    insulin detemir (LEVEMIR) 100 UNIT/ML injection Take 35 units subcutaneous in the morning and 35 units subcutaneous in the evening    insulin glargine U-300 (TOUJEO) 72 Units, Subcutaneous, AT BEDTIME    Insulin Pen Needle (PEN NEEDLES) 29G X 12MM MISC As directed. 31 G x 6 MM . Use with Victoza to inject subcu once daily     levothyroxine (SYNTHROID/LEVOTHROID) 75 mcg, Oral, DAILY    lisinopril (ZESTRIL) 10 mg, Oral, DAILY    metFORMIN (GLUCOPHAGE) 1,000 mg, Oral, 2 TIMES DAILY WITH MEALS    simvastatin (ZOCOR) 20 mg, Oral, DAILY       ALLERGIES:  Allergies   Allergen Reactions     Acetone Shortness Of Breath     Other reaction(s): Erythema    Hazelnuts [Nuts] Rash    Sulindac Rash     weakness       FAMILY HISTORY:  Family History   Problem Relation Age of Onset    Hypertension Mother         Hypertension,Hypertension.    Other - See Comments Mother          Chronic heart failure.  Dense fibrocystic breast disease.    Breast Cancer Paternal Grandmother         Cancer-breast,breast cancer, mastectomy    Diabetes Father         Diabetes,Multiple members on father's side with diabetes    Breast Cancer Paternal Aunt         Cancer-breast, bilateral mastectomy for breast cancer       SOCIAL HISTORY:   Social History     Socioeconomic History    Marital status:      Spouse name: None    Number of children: None    Years of education: None    Highest education level: None   Tobacco Use    Smoking status: Former     Packs/day: 0.50     Years: 12.00     Additional pack years: 0.00     Total pack years: 6.00     Types: Cigarettes     Quit date: 1997     Years since quittin.1    Smokeless tobacco: Never   Substance and Sexual Activity    Alcohol use: Yes     Alcohol/week: 1.0 standard drink of alcohol     Comment: Alcoholic Drinks/day: 1 per month    Drug use: Unknown     Types: Other     Comment: Drug use: No    Sexual activity: Yes     Partners: Female   Social History Narrative    She is single. Has significant other.       [place].  Shares an apartment with a roommate.     Preload  2013.       PHYSICAL EXAM    VITAL SIGNS: /85   Pulse 81   Temp 98  F (36.7  C) (Tympanic)   Resp 18   SpO2 97%     Patient Vitals for the past 24 hrs:   BP Temp Temp src Pulse Resp SpO2   236 115/85 -- -- 81 18 97 %   23 122/80 98  F (36.7  C) Tympanic 81 16 98 %       Physical Exam  Vitals and nursing note reviewed.   Constitutional:       Appearance: Normal appearance. She is not ill-appearing or diaphoretic.   HENT:      Nose: Nose normal.      Mouth/Throat:       Mouth: Mucous membranes are moist.      Pharynx: Oropharynx is clear.   Eyes:      Conjunctiva/sclera: Conjunctivae normal.   Cardiovascular:      Rate and Rhythm: Normal rate.      Pulses: Normal pulses.      Heart sounds: Normal heart sounds.   Pulmonary:      Effort: Pulmonary effort is normal. No respiratory distress.      Breath sounds: Normal breath sounds. No wheezing, rhonchi or rales.   Chest:      Chest wall: Tenderness (Patient with positive right anterior and lateral chest wall pain with no signs of gross deformity or flail chest.) present.   Abdominal:      General: Abdomen is flat. Bowel sounds are normal.      Palpations: Abdomen is soft.      Tenderness: There is no abdominal tenderness. There is no right CVA tenderness or left CVA tenderness.      Comments: No abdominal, flank, or CVA tenderness.   Musculoskeletal:         General: Normal range of motion.      Cervical back: Normal range of motion and neck supple.      Right lower leg: No edema.      Left lower leg: No edema.   Skin:     General: Skin is warm and dry.      Capillary Refill: Capillary refill takes less than 2 seconds.   Neurological:      General: No focal deficit present.      Mental Status: She is alert.   Psychiatric:         Mood and Affect: Mood normal.              LABS & RADIOLOGY:  All pertinent labs reviewed and interpreted. Reviewed all pertinent imaging. Please see official radiology report.  Results for orders placed or performed during the hospital encounter of 11/02/23   XR Ribs & Chest Rt 3vw    Impression    IMPRESSION: Clear chest. No evidence of pneumothorax or rib fracture.    RISHI TOMLIN MD         SYSTEM ID:  [id number]     XR Ribs & Chest Rt 3vw   Final Result   IMPRESSION: Clear chest. No evidence of pneumothorax or rib fracture.      RISHI TOMLIN MD            SYSTEM ID:  [id number]              MARIZA, Russel Longoria PA-C, personally performed the services described in this documentation, and it is both  accurate and complete.       Clarke Longoria PA-C  11/02/23 1814

## 2023-11-03 NOTE — DISCHARGE INSTRUCTIONS
-No signs of acute fracture on x-rays.  -Recommend using Norco and Vistaril for pain control.  -Expect to have a significant pain in the next 3 to 4 days before seeing improvement in symptoms.  May take up to 4 to 6 weeks to completely heal.  -Please return to the ER if you have any worsening of symptoms.

## 2023-11-03 NOTE — ED TRIAGE NOTES
"Pt arrives via private car with complaints of right sided rib pain. Pt states she was scrubbing the bath tub when she reached over to the right and felt a \"pop\" accompanied by \"excruciating pain\". Denies any difficulty breathing.     Triage Assessment (Adult)       Row Name 11/02/23 2030          Triage Assessment    Airway WDL WDL        Respiratory WDL    Respiratory WDL WDL        Skin Circulation/Temperature WDL    Skin Circulation/Temperature WDL WDL        Cardiac WDL    Cardiac WDL WDL        Peripheral/Neurovascular WDL    Peripheral Neurovascular WDL WDL        Cognitive/Neuro/Behavioral WDL    Cognitive/Neuro/Behavioral WDL WDL                     "

## 2023-11-22 ENCOUNTER — HOSPITAL ENCOUNTER (EMERGENCY)
Facility: OTHER | Age: 48
Discharge: HOME | End: 2023-11-22
Attending: EMERGENCY MEDICINE | Admitting: EMERGENCY MEDICINE
Payer: COMMERCIAL

## 2023-11-22 ENCOUNTER — APPOINTMENT (OUTPATIENT)
Dept: GENERAL RADIOLOGY | Facility: OTHER | Age: 48
End: 2023-11-22
Attending: INTERNAL MEDICINE
Payer: COMMERCIAL

## 2023-11-22 ENCOUNTER — APPOINTMENT (OUTPATIENT)
Dept: GENERAL RADIOLOGY | Facility: OTHER | Age: 48
End: 2023-11-22
Attending: EMERGENCY MEDICINE
Payer: COMMERCIAL

## 2023-11-22 VITALS
OXYGEN SATURATION: 94 % | RESPIRATION RATE: 16 BRPM | WEIGHT: 186 LBS | HEART RATE: 94 BPM | TEMPERATURE: 98.8 F | SYSTOLIC BLOOD PRESSURE: 123 MMHG | DIASTOLIC BLOOD PRESSURE: 78 MMHG | BODY MASS INDEX: 37.57 KG/M2

## 2023-11-22 DIAGNOSIS — S62.607A CLOSED DISPLACED FRACTURE OF PHALANX OF LEFT LITTLE FINGER, UNSPECIFIED PHALANX, INITIAL ENCOUNTER: ICD-10-CM

## 2023-11-22 PROCEDURE — 26725 TREAT FINGER FRACTURE EACH: CPT | Mod: F4 | Performed by: EMERGENCY MEDICINE

## 2023-11-22 PROCEDURE — 26725 TREAT FINGER FRACTURE EACH: CPT | Mod: 54 | Performed by: EMERGENCY MEDICINE

## 2023-11-22 PROCEDURE — 99283 EMERGENCY DEPT VISIT LOW MDM: CPT | Mod: 25 | Performed by: EMERGENCY MEDICINE

## 2023-11-22 PROCEDURE — 999N000065 XR FINGER LEFT G/E 2 VIEWS: Mod: LT

## 2023-11-22 PROCEDURE — 99207 PR NO CHARGE LOS: CPT | Performed by: EMERGENCY MEDICINE

## 2023-11-22 PROCEDURE — 73140 X-RAY EXAM OF FINGER(S): CPT | Mod: LT

## 2023-11-22 PROCEDURE — 250N000013 HC RX MED GY IP 250 OP 250 PS 637: Performed by: EMERGENCY MEDICINE

## 2023-11-22 PROCEDURE — 99283 EMERGENCY DEPT VISIT LOW MDM: CPT | Performed by: EMERGENCY MEDICINE

## 2023-11-22 RX ORDER — ACETAMINOPHEN 325 MG/1
650 TABLET ORAL ONCE
Status: COMPLETED | OUTPATIENT
Start: 2023-11-22 | End: 2023-11-22

## 2023-11-22 RX ORDER — HYDROCODONE BITARTRATE AND ACETAMINOPHEN 5; 325 MG/1; MG/1
1 TABLET ORAL EVERY 6 HOURS PRN
Qty: 6 TABLET | Refills: 0 | Status: SHIPPED | OUTPATIENT
Start: 2023-11-22

## 2023-11-22 RX ADMIN — ACETAMINOPHEN 650 MG: 325 TABLET, FILM COATED ORAL at 20:07

## 2023-11-22 ASSESSMENT — ACTIVITIES OF DAILY LIVING (ADL)
ADLS_ACUITY_SCORE: 35
ADLS_ACUITY_SCORE: 33

## 2023-11-23 NOTE — DISCHARGE INSTRUCTIONS
1) You must follow-up with orthopedics next week.  Call [employer] to be referred to an orthopedist  2) Your blood pressure today was elevated. You must have it rechecked by your primary care doctor within one week.   3) Follow the aftercare instructions provided  4) Do not use your splint for more than a week unless instructed to do so by a physician  5) If you develop increasing pain, change in skin color, numbness or weakness or any worsening symptoms return to the ER immediately  6) You have been given a medication or prescription for a medication that can make you drowsy. Do not drink, drive, ride a bicycle or operate any heavy machinery while using this medication.   
(0) independent

## 2023-11-23 NOTE — ED PROVIDER NOTES
History     Chief Complaint   Patient presents with    Hand Injury     Left hand       HPI  Liliana Yao is a 48 year old female who presents today with complaints of left hand pain.  Symptoms began just prior to arrival when patient accidentally tripped on the rug in her house breaking her fall for left hand.  No loss of consciousness.  No additional injuries.  Patient noticed deformity of left fifth finger prompting ER visit.  No additional complaints.  Just underwent a colonoscopy today.  Patient is right-handed.    Allergies:  Allergies   Allergen Reactions    Acetone Shortness Of Breath     Other reaction(s): Erythema    Hazelnuts [Nuts] Rash    Sulindac Rash     weakness       Problem List:    Patient Active Problem List    Diagnosis Date Noted    Depression 04/20/2022     Priority: Medium    Panic attacks 01/31/2020     Priority: Medium    Morbid obesity with BMI of 40.0-44.9, adult (H) 12/06/2019     Priority: Medium    Hashimoto's thyroiditis 03/13/2019     Priority: Medium    Anxiety state 02/08/2018     Priority: Medium     Overview:   with psychotic features      Major depressive disorder, recurrent episode, moderate (H) 02/08/2018     Priority: Medium     Overview:   Clinical      Diabetes mellitus, type II (H) 02/08/2018     Priority: Medium     Overview:   Noncompliant  a1c = 8.9 on 4/14/15.   Referred to eye doctor.  On aspirin.  On statin/ace       Morbid obesity (H) 02/08/2018     Priority: Medium     Overview:   Noncompliant      Vitamin D deficiency 02/08/2018     Priority: Medium    Type 2 diabetes mellitus, uncontrolled 12/08/2015     Priority: Medium    Hx of self mutilation 08/17/2015     Priority: Medium     Overview:   none in last 10 years      Lumbago 08/17/2015     Priority: Medium    Goiter 12/31/2012     Priority: Medium    Low back pain syndrome 08/29/2011     Priority: Medium    Essential hypertension, benign 05/18/2011     Priority: Medium    Knee pain 02/15/2011     Priority:  Medium     Overview:   left      Calcaneal spur of foot, left 2010     Priority: Medium    Plantar fasciitis 2010     Priority: Medium    Hearing loss 2010     Priority: Medium    Hypercholesterolemia 2007     Priority: Medium        Past Medical History:    Past Medical History:   Diagnosis Date    Allergic rhinitis     Coagulation defect (H24)     Diarrhea     Encounter for other general examination (CODE)     Obesity     Otitis media     Personal history of other mental and behavioral disorders     Sebaceous cyst     Torticollis     Uncomplicated asthma        Past Surgical History:    Past Surgical History:   Procedure Laterality Date    EXCISE CYST GENERIC (LOCATION)      The patient had right ganglion cyst removal.    TYMPANOSTOMY, LOCAL/TOPICAL ANESTHESIA      PE tubes at age 4 & 24    TYMPANOSTOMY, LOCAL/TOPICAL ANESTHESIA      2008,Tube placement by Dr. Paco Izquierdo       Family History:    Family History   Problem Relation Age of Onset    Hypertension Mother         Hypertension,Hypertension.    Other - See Comments Mother          Chronic heart failure.  Dense fibrocystic breast disease.    Breast Cancer Paternal Grandmother         Cancer-breast,breast cancer, mastectomy    Diabetes Father         Diabetes,Multiple members on father's side with diabetes    Breast Cancer Paternal Aunt         Cancer-breast, bilateral mastectomy for breast cancer       Social History:  Marital Status:   [2]  Social History     Tobacco Use    Smoking status: Former     Packs/day: 0.50     Years: 12.00     Additional pack years: 0.00     Total pack years: 6.00     Types: Cigarettes     Quit date: 1997     Years since quittin.2    Smokeless tobacco: Never   Substance Use Topics    Alcohol use: Yes     Alcohol/week: 1.0 standard drink of alcohol     Comment: Alcoholic Drinks/day: 1 per month    Drug use: Unknown     Types: Other     Comment: Drug use: No        Medications:     ARIPiprazole (ABILIFY) 5 MG tablet  blood glucose (NO BRAND SPECIFIED) test strip  escitalopram (LEXAPRO) 20 MG tablet  glucagon 1 MG kit  HYDROcodone-acetaminophen (NORCO) 5-325 MG tablet  hydrOXYzine (VISTARIL) 25 MG capsule  insulin detemir (LEVEMIR) 100 UNIT/ML injection  insulin glargine U-300 (TOUJEO) 300 UNIT/ML (1 units dial) pen  Insulin Pen Needle (PEN NEEDLES) 29G X 12MM MISC  levothyroxine (SYNTHROID/LEVOTHROID) 75 MCG tablet  lisinopril (PRINIVIL/ZESTRIL) 10 MG tablet  metFORMIN (GLUCOPHAGE) 500 MG tablet  simvastatin (ZOCOR) 20 MG tablet          Review of Systems   All other systems reviewed and are negative.      Physical Exam   BP: (!) 146/90  Pulse: 85  Temp: (!) 95.3  F (35.2  C)  Resp: 16  Weight: 84.4 kg (186 lb)  SpO2: 94 %      Physical Exam  Constitutional:       General: She is not in acute distress.     Appearance: Normal appearance. She is normal weight. She is not toxic-appearing.   HENT:      Head: Normocephalic and atraumatic.   Pulmonary:      Effort: Pulmonary effort is normal.   Musculoskeletal:      Comments: Left hand -left fifth finger flexed 90 degrees at PIP.  Also twisted inward (median deviation).  Full range of motion of all other fingers.  Full range of motion of wrist.  Uninjured above wrist   Skin:     Capillary Refill: Capillary refill takes less than 2 seconds.   Neurological:      General: No focal deficit present.      Mental Status: She is alert.   Psychiatric:         Mood and Affect: Mood normal.         Behavior: Behavior normal.         ED Course              ED Course as of 11/22/23 2141 Wed Nov 22, 2023 2056 [facility] paged   [phone] Case discussed with Dr. Anny Martinez, on-call hand specialist at [facility].  She reviewed the x-rays.  No other alignment or reduction would be necessary at this point.  Patient should be placed in a ulnar gutter splint and will follow-up in their clinic in a week and a half.     [facility]    Splint Application    Date/Time: 11/22/2023 9:44 PM    Performed by: Alonzo Tapia MD  Authorized by: Alonzo Tapia MD    Risks, benefits and alternatives discussed.      PRE-PROCEDURE DETAILS     Sensation:  Normal    PROCEDURE DETAILS     Laterality:  Left    Location:  Hand    Cast type:  Short arm    Splint type:  Ulnar gutter    Supplies:  Ortho-Glass    POST PROCEDURE DETAILS     Pain:  Unchanged    Sensation:  Normal      PROCEDURE    Patient Tolerance:  Patient tolerated the procedure well with no immediate complications  [facility]    Digital Block    Date/Time: 11/22/2023 9:45 PM    Performed by: Alonzo Tapia MD  Authorized by: Alonzo Tapia MD    Risks, benefits and alternatives discussed.      INDICATION     Indications:  Pain relief    LOCATION     Block location:  Finger    Finger blocked:  L little finger    PRE-PROCEDURE DETAILS     Neurovascular status: intact      Skin preparation:  Alcohol    ANESTHESIA (see MAR for exact dosages)     Anesthetic injected:  Lidocaine 1% w/o epi    Technique:  Three-sided block    Injection procedure:  Anatomic landmarks identified    POST-PROCEDURE DETAILS     Outcome:  Anesthesia achieved      PROCEDURE    Patient Tolerance:  Patient tolerated the procedure well with no immediate complications  [facility]    -Fracture    Date/Time: 12/24/2023 11:30 AM    Performed by: Alonzo Tapia MD  Authorized by: Alonzo Tapia MD    Risks, benefits and alternatives discussed.      INJURY      Injury location:  Finger    Finger injury location:  L little finger    Finger fracture type: proximal phalanx fracture      MCP joint involved: no      IP joint involved: no      PRE PROCEDURE ASSESSMENT      Neurological function: normal      Distal perfusion: normal      Range of motion: reduced      ANESTHESIA (see MAR for exact dosages)      Anesthesia method:  Nerve block    Block needle gauge:  27 G    Block  anesthetic:  Lidocaine 1% w/o epi    Block technique:  Digitial block of 5th finger    Block injection procedure:  Anatomic landmarks identified, negative aspiration for blood, introduced needle, incremental injection and anatomic landmarks palpated    Block outcome:  Anesthesia achieved    PROCEDURE DETAILS:     Manipulation performed: yes      Skin traction used: no      Skeletal traction used: yes      Pin inserted: no      Reduction successful: yes      X-ray confirmed reduction: yes      Immobilization:  Splint    Splint type:  Ulnar gutter    Supplies used:  Ortho-Glass    POST PROCEDURE ASSESSMENT      Neurological function: normal      Distal perfusion: normal      Range of motion: normal        PROCEDURE    Patient Tolerance:  Patient tolerated the procedure well with no immediate complications                  Results for orders placed or performed during the hospital encounter of 11/22/23 (from the past 24 hour(s))   XR Finger Port Left G/E 2 Views    Narrative    XR FINGER PORT LEFT G/E 2 VIEWS    HISTORY: 48 years Female Fall . Fifth digit pain and deformity.    COMPARISON: None    TECHNIQUE: Left fifth digit 4 views    FINDINGS: There is a fracture of the proximal phalanx. The fracture is  transversely oriented across the base of the phalanx the articular  surface is congruent. There is radial and dorsal angulation. There is  slight ulnar sided displacement.      Impression    IMPRESSION: Proximal phalanx fracture of the fifth digit with  angulation and displacement as described above.    RISHI TOMLIN MD         SYSTEM ID:  [id number]   XR Finger Left G/E 2 Views    Narrative    XR FINGER LEFT G/E 2 VIEWS    HISTORY: 48 years Female 48-year-old status post reduction of 5th  finger fracture/dislocation.  Confirm anatomic relocation    COMPARISON: Earlier same day    TECHNIQUE: Left fifth digit 3 views    FINDINGS: There has been interval reduction of the fracture of the  proximal phalanx. There is  improvement of radial angulation, there  continues to be dorsal angulation as seen on the lateral view. Dorsal  angulation approximates 30 degrees or more.    Impression    IMPRESSION: Some improvement in alignment after closed reduction.  There is continued dorsal angulation of the fracture.    RISHI TOMLIN MD         SYSTEM ID:  [id number]       Medications - No data to display    Assessments & Plan (with Medical Decision Making)     48-year-old female presents today with complaints of finger pain after she fell.  Had a base of the fifth finger fracture angulated.  On physical exam finger looked deformed.  Patient had a digital block and had fracture reduced to normal anatomic position.  Postreduction x-rays showed reduction in displacement.    Case discussed with the on-call hand specialist Dr. Martinez who agrees that no further reduction needed.  States that patient should be placed in an ulnar gutter splint in slight flexion and referred to orthopedic clinic within the next week and a half.  Splint placed without complication.  Patient given 6 Norco per his pills to take as needed.  [employer] database checked prescription and patient last had a prescription for Norco #2 on the second of this month.  Given physical findings today I feel comfortable giving an additional 6 more pills  to be taken as needed for pain related to this fracture.    No Ortho referral given as warning posted in patient's chart stated that she will be responsible for any referrals done outside the [facility] system.  Patient stated that she has an orthopedist that she will follow-up with.  Repeat blood pressure prior to discharge was normal.        New Prescriptions    No medications on file       Final diagnoses:   Closed displaced fracture of phalanx of left little finger, unspecified phalanx, initial encounter       11/22/2023   [facility]       Alonzo Tapia MD  11/23/23 0329       Alonzo Tapia MD  12/24/23  [id number]

## 2023-11-23 NOTE — ED TRIAGE NOTES
Pt is here today with a left hand injury.   Pt fell in her house and landed on her left hand. Fell occurred 30 minutes ago.   Pt has deformation to her 5th finger.   Pt is not on blood thinners.  Did not take anything for pain  BP (!) 146/90   Pulse 85   Temp (!) 95.3  F (35.2  C)   Resp 16   Wt 84.4 kg (186 lb)   SpO2 94%   BMI 37.57 kg/m    Daniela Adan RN on 11/22/2023 at 6:49 PM       Triage Assessment (Adult)       Row Name 11/22/23 2872          Triage Assessment    Airway WDL WDL        Respiratory WDL    Respiratory WDL WDL        Skin Circulation/Temperature WDL    Skin Circulation/Temperature WDL WDL        Cardiac WDL    Cardiac WDL WDL        Cognitive/Neuro/Behavioral WDL    Cognitive/Neuro/Behavioral WDL WDL

## 2024-01-28 ENCOUNTER — APPOINTMENT (OUTPATIENT)
Dept: CT IMAGING | Facility: OTHER | Age: 49
End: 2024-01-28
Attending: FAMILY MEDICINE
Payer: COMMERCIAL

## 2024-01-28 ENCOUNTER — HOSPITAL ENCOUNTER (EMERGENCY)
Facility: OTHER | Age: 49
Discharge: HOME | End: 2024-01-29
Attending: FAMILY MEDICINE | Admitting: FAMILY MEDICINE
Payer: COMMERCIAL

## 2024-01-28 ENCOUNTER — APPOINTMENT (OUTPATIENT)
Dept: GENERAL RADIOLOGY | Facility: OTHER | Age: 49
End: 2024-01-28
Attending: FAMILY MEDICINE
Payer: COMMERCIAL

## 2024-01-28 VITALS
SYSTOLIC BLOOD PRESSURE: 106 MMHG | TEMPERATURE: 98.9 F | HEART RATE: 90 BPM | HEIGHT: 59 IN | WEIGHT: 185 LBS | OXYGEN SATURATION: 95 % | BODY MASS INDEX: 37.29 KG/M2 | RESPIRATION RATE: 17 BRPM | DIASTOLIC BLOOD PRESSURE: 66 MMHG

## 2024-01-28 DIAGNOSIS — J18.9 PNEUMONIA DUE TO INFECTIOUS ORGANISM, UNSPECIFIED LATERALITY, UNSPECIFIED PART OF LUNG: ICD-10-CM

## 2024-01-28 LAB
ALBUMIN SERPL BCG-MCNC: 3.7 G/DL (ref 3.5–5.2)
ALBUMIN UR-MCNC: NEGATIVE MG/DL
ALP SERPL-CCNC: 149 U/L (ref 40–150)
ALT SERPL W P-5'-P-CCNC: 50 U/L (ref 0–50)
ANION GAP SERPL CALCULATED.3IONS-SCNC: 15 MMOL/L (ref 7–15)
APPEARANCE UR: CLEAR
AST SERPL W P-5'-P-CCNC: 31 U/L (ref 0–45)
BACTERIA #/AREA URNS HPF: ABNORMAL /HPF
BASOPHILS # BLD AUTO: 0 10E3/UL (ref 0–0.2)
BASOPHILS NFR BLD AUTO: 0 %
BILIRUB SERPL-MCNC: 0.8 MG/DL
BILIRUB UR QL STRIP: NEGATIVE
BUN SERPL-MCNC: 23.6 MG/DL (ref 6–20)
CALCIUM SERPL-MCNC: 9.3 MG/DL (ref 8.6–10)
CHLORIDE SERPL-SCNC: 95 MMOL/L (ref 98–107)
COLOR UR AUTO: YELLOW
CREAT SERPL-MCNC: 0.91 MG/DL (ref 0.51–0.95)
D DIMER PPP FEU-MCNC: 1.2 UG/ML FEU (ref 0–0.5)
DEPRECATED HCO3 PLAS-SCNC: 20 MMOL/L (ref 22–29)
EGFRCR SERPLBLD CKD-EPI 2021: 78 ML/MIN/1.73M2
EOSINOPHIL # BLD AUTO: 0 10E3/UL (ref 0–0.7)
EOSINOPHIL NFR BLD AUTO: 0 %
ERYTHROCYTE [DISTWIDTH] IN BLOOD BY AUTOMATED COUNT: 13.4 % (ref 10–15)
FLUAV RNA SPEC QL NAA+PROBE: NEGATIVE
FLUBV RNA RESP QL NAA+PROBE: NEGATIVE
GLUCOSE SERPL-MCNC: 212 MG/DL (ref 70–99)
GLUCOSE UR STRIP-MCNC: >=1000 MG/DL
HCT VFR BLD AUTO: 37.2 % (ref 35–47)
HGB BLD-MCNC: 12.5 G/DL (ref 11.7–15.7)
HGB UR QL STRIP: NEGATIVE
HOLD SPECIMEN: NORMAL
IMM GRANULOCYTES # BLD: 0.1 10E3/UL
IMM GRANULOCYTES NFR BLD: 1 %
KETONES UR STRIP-MCNC: NEGATIVE MG/DL
LACTATE SERPL-SCNC: 0.8 MMOL/L (ref 0.7–2)
LEUKOCYTE ESTERASE UR QL STRIP: NEGATIVE
LIPASE SERPL-CCNC: 21 U/L (ref 13–60)
LYMPHOCYTES # BLD AUTO: 1.6 10E3/UL (ref 0.8–5.3)
LYMPHOCYTES NFR BLD AUTO: 14 %
MCH RBC QN AUTO: 28.5 PG (ref 26.5–33)
MCHC RBC AUTO-ENTMCNC: 33.6 G/DL (ref 31.5–36.5)
MCV RBC AUTO: 85 FL (ref 78–100)
MONOCYTES # BLD AUTO: 1.5 10E3/UL (ref 0–1.3)
MONOCYTES NFR BLD AUTO: 13 %
NEUTROPHILS # BLD AUTO: 8.6 10E3/UL (ref 1.6–8.3)
NEUTROPHILS NFR BLD AUTO: 72 %
NITRATE UR QL: NEGATIVE
NRBC # BLD AUTO: 0 10E3/UL
NRBC BLD AUTO-RTO: 0 /100
PH UR STRIP: 5.5 [PH] (ref 5–9)
PLATELET # BLD AUTO: 228 10E3/UL (ref 150–450)
POTASSIUM SERPL-SCNC: 4.8 MMOL/L (ref 3.4–5.3)
PROT SERPL-MCNC: 8.2 G/DL (ref 6.4–8.3)
RBC # BLD AUTO: 4.39 10E6/UL (ref 3.8–5.2)
RBC URINE: 3 /HPF
RSV RNA SPEC NAA+PROBE: NEGATIVE
SARS-COV-2 RNA RESP QL NAA+PROBE: NEGATIVE
SODIUM SERPL-SCNC: 130 MMOL/L (ref 135–145)
SP GR UR STRIP: <=1.005 (ref 1–1.03)
TROPONIN T SERPL HS-MCNC: 11 NG/L
UROBILINOGEN UR STRIP-MCNC: NORMAL MG/DL
WBC # BLD AUTO: 11.9 10E3/UL (ref 4–11)
WBC URINE: 2 /HPF

## 2024-01-28 PROCEDURE — 96375 TX/PRO/DX INJ NEW DRUG ADDON: CPT | Mod: XU | Performed by: FAMILY MEDICINE

## 2024-01-28 PROCEDURE — 84484 ASSAY OF TROPONIN QUANT: CPT | Performed by: FAMILY MEDICINE

## 2024-01-28 PROCEDURE — 250N000011 HC RX IP 250 OP 636: Performed by: FAMILY MEDICINE

## 2024-01-28 PROCEDURE — 85041 AUTOMATED RBC COUNT: CPT | Performed by: FAMILY MEDICINE

## 2024-01-28 PROCEDURE — 71045 X-RAY EXAM CHEST 1 VIEW: CPT | Mod: TC

## 2024-01-28 PROCEDURE — 87040 BLOOD CULTURE FOR BACTERIA: CPT | Mod: 91 | Performed by: FAMILY MEDICINE

## 2024-01-28 PROCEDURE — 81001 URINALYSIS AUTO W/SCOPE: CPT | Performed by: FAMILY MEDICINE

## 2024-01-28 PROCEDURE — 96361 HYDRATE IV INFUSION ADD-ON: CPT | Performed by: FAMILY MEDICINE

## 2024-01-28 PROCEDURE — 93005 ELECTROCARDIOGRAM TRACING: CPT | Performed by: FAMILY MEDICINE

## 2024-01-28 PROCEDURE — 36415 COLL VENOUS BLD VENIPUNCTURE: CPT | Performed by: FAMILY MEDICINE

## 2024-01-28 PROCEDURE — 85379 FIBRIN DEGRADATION QUANT: CPT | Performed by: FAMILY MEDICINE

## 2024-01-28 PROCEDURE — 87637 SARSCOV2&INF A&B&RSV AMP PRB: CPT | Performed by: FAMILY MEDICINE

## 2024-01-28 PROCEDURE — 99285 EMERGENCY DEPT VISIT HI MDM: CPT | Mod: 25 | Performed by: FAMILY MEDICINE

## 2024-01-28 PROCEDURE — 96365 THER/PROPH/DIAG IV INF INIT: CPT | Mod: XU | Performed by: FAMILY MEDICINE

## 2024-01-28 PROCEDURE — 99284 EMERGENCY DEPT VISIT MOD MDM: CPT | Performed by: FAMILY MEDICINE

## 2024-01-28 PROCEDURE — 71275 CT ANGIOGRAPHY CHEST: CPT | Mod: TC

## 2024-01-28 PROCEDURE — 93010 ELECTROCARDIOGRAM REPORT: CPT | Performed by: STUDENT IN AN ORGANIZED HEALTH CARE EDUCATION/TRAINING PROGRAM

## 2024-01-28 PROCEDURE — 80053 COMPREHEN METABOLIC PANEL: CPT | Performed by: FAMILY MEDICINE

## 2024-01-28 PROCEDURE — 83690 ASSAY OF LIPASE: CPT | Performed by: FAMILY MEDICINE

## 2024-01-28 PROCEDURE — 83605 ASSAY OF LACTIC ACID: CPT | Performed by: FAMILY MEDICINE

## 2024-01-28 PROCEDURE — 258N000003 HC RX IP 258 OP 636: Performed by: FAMILY MEDICINE

## 2024-01-28 RX ORDER — CEFTRIAXONE 1 G/1
1 INJECTION, POWDER, FOR SOLUTION INTRAMUSCULAR; INTRAVENOUS ONCE
Status: COMPLETED | OUTPATIENT
Start: 2024-01-28 | End: 2024-01-28

## 2024-01-28 RX ORDER — AZITHROMYCIN 250 MG/1
TABLET, FILM COATED ORAL
Qty: 6 TABLET | Refills: 0 | Status: SHIPPED | OUTPATIENT
Start: 2024-01-28 | End: 2024-02-02

## 2024-01-28 RX ORDER — KETOROLAC TROMETHAMINE 15 MG/ML
15 INJECTION, SOLUTION INTRAMUSCULAR; INTRAVENOUS ONCE
Status: COMPLETED | OUTPATIENT
Start: 2024-01-28 | End: 2024-01-28

## 2024-01-28 RX ORDER — IOPAMIDOL 755 MG/ML
80 INJECTION, SOLUTION INTRAVASCULAR ONCE
Status: COMPLETED | OUTPATIENT
Start: 2024-01-28 | End: 2024-01-28

## 2024-01-28 RX ADMIN — SODIUM CHLORIDE 1000 ML: 9 INJECTION, SOLUTION INTRAVENOUS at 21:13

## 2024-01-28 RX ADMIN — IOPAMIDOL 80 ML: 755 INJECTION, SOLUTION INTRAVENOUS at 22:30

## 2024-01-28 RX ADMIN — CEFTRIAXONE SODIUM 1 G: 1 INJECTION, POWDER, FOR SOLUTION INTRAMUSCULAR; INTRAVENOUS at 23:20

## 2024-01-28 RX ADMIN — KETOROLAC TROMETHAMINE 15 MG: 15 INJECTION, SOLUTION INTRAMUSCULAR; INTRAVENOUS at 21:14

## 2024-01-28 ASSESSMENT — ENCOUNTER SYMPTOMS
FATIGUE: 0
FEVER: 0
STRIDOR: 0
DIARRHEA: 0
PALPITATIONS: 0
HEMATURIA: 0
WHEEZING: 0
NECK STIFFNESS: 0
ACTIVITY CHANGE: 0
CHILLS: 0
APPETITE CHANGE: 0
DIZZINESS: 0
COUGH: 1
RHINORRHEA: 0
NAUSEA: 0
ARTHRALGIAS: 0
EYE REDNESS: 0
MYALGIAS: 0
DYSURIA: 0
VOMITING: 0
SHORTNESS OF BREATH: 1
LIGHT-HEADEDNESS: 1
HEADACHES: 0
SORE THROAT: 0

## 2024-01-28 ASSESSMENT — ACTIVITIES OF DAILY LIVING (ADL)
ADLS_ACUITY_SCORE: 35
ADLS_ACUITY_SCORE: 35

## 2024-01-29 LAB
ATRIAL RATE - MUSE: 106 BPM
DIASTOLIC BLOOD PRESSURE - MUSE: NORMAL MMHG
INTERPRETATION ECG - MUSE: NORMAL
P AXIS - MUSE: 41 DEGREES
PR INTERVAL - MUSE: 154 MS
QRS DURATION - MUSE: 88 MS
QT - MUSE: 306 MS
QTC - MUSE: 406 MS
R AXIS - MUSE: 0 DEGREES
SYSTOLIC BLOOD PRESSURE - MUSE: NORMAL MMHG
T AXIS - MUSE: -1 DEGREES
VENTRICULAR RATE- MUSE: 106 BPM

## 2024-01-29 NOTE — ED PROVIDER NOTES
"  History     Chief Complaint   Patient presents with    Rib Pain     left     HPI  Liliana Yao is a 48 year old female history diabetes, thyroiditis, obesity, depression, anxiety who presents to the ED with \"rib pain\" worse with deep breath.  This began yesterday.  Slight cough and congestion.  Negative COVID test at home.  No chest pain in the front, pain is on the left side wraps around her back.  Motrin did help with the pain some.  Minimal shortness of breath.  No history of blood clot.  No fever at home.    Reviewed nurses notes below, similar history is related to me.  Patient here with left side rib pain that started yesterday and is worse today. Patient reports a slight cough and has had a negative home COVID test. Patient also reports that it hurts to take a deep breath. Patient states motrin helps with the pain and last took it at 1900.   Allergies:  Allergies   Allergen Reactions    Acetone Shortness Of Breath     Other reaction(s): Erythema    Hazelnuts [Nuts] Rash    Sulindac Rash     weakness       Problem List:    Patient Active Problem List    Diagnosis Date Noted    Depression 04/20/2022     Priority: Medium    Panic attacks 01/31/2020     Priority: Medium    Morbid obesity with BMI of 40.0-44.9, adult (H) 12/06/2019     Priority: Medium    Hashimoto's thyroiditis 03/13/2019     Priority: Medium    Anxiety state 02/08/2018     Priority: Medium     Overview:   with psychotic features      Major depressive disorder, recurrent episode, moderate (H) 02/08/2018     Priority: Medium     Overview:   Clinical      Diabetes mellitus, type II (H) 02/08/2018     Priority: Medium     Overview:   Noncompliant  a1c = 8.9 on 4/14/15.   Referred to eye doctor.  On aspirin.  On statin/ace       Morbid obesity (H) 02/08/2018     Priority: Medium     Overview:   Noncompliant      Vitamin D deficiency 02/08/2018     Priority: Medium    Type 2 diabetes mellitus, uncontrolled 12/08/2015     Priority: Medium    Hx of " self mutilation 08/17/2015     Priority: Medium     Overview:   none in last 10 years      Lumbago 08/17/2015     Priority: Medium    Goiter 12/31/2012     Priority: Medium    Low back pain syndrome 08/29/2011     Priority: Medium    Essential hypertension, benign 05/18/2011     Priority: Medium    Knee pain 02/15/2011     Priority: Medium     Overview:   left      Calcaneal spur of foot, left 12/01/2010     Priority: Medium    Plantar fasciitis 09/28/2010     Priority: Medium    Hearing loss 01/13/2010     Priority: Medium    Hypercholesterolemia 02/01/2007     Priority: Medium        Past Medical History:    Past Medical History:   Diagnosis Date    Allergic rhinitis     Coagulation defect (H24)     Diarrhea     Encounter for other general examination (CODE)     Obesity     Otitis media     Personal history of other mental and behavioral disorders     Sebaceous cyst     Torticollis     Uncomplicated asthma        Past Surgical History:    Past Surgical History:   Procedure Laterality Date    EXCISE CYST GENERIC (LOCATION)      The patient had right ganglion cyst removal.    TYMPANOSTOMY, LOCAL/TOPICAL ANESTHESIA      PE tubes at age 4 & 24    TYMPANOSTOMY, LOCAL/TOPICAL ANESTHESIA      07/08/2008,Tube placement by Dr. Paco Izquierdo       Family History:    Family History   Problem Relation Age of Onset    Hypertension Mother         Hypertension,Hypertension.    Other - See Comments Mother          Chronic heart failure.  Dense fibrocystic breast disease.    Breast Cancer Paternal Grandmother         Cancer-breast,breast cancer, mastectomy    Diabetes Father         Diabetes,Multiple members on father's side with diabetes    Breast Cancer Paternal Aunt         Cancer-breast, bilateral mastectomy for breast cancer       Social History:  Marital Status:   [2]  Social History     Tobacco Use    Smoking status: Former     Packs/day: 0.50     Years: 12.00     Additional pack years: 0.00     Total pack years: 6.00  "    Types: Cigarettes     Quit date: 1997     Years since quittin.3    Smokeless tobacco: Never   Substance Use Topics    Alcohol use: Yes     Alcohol/week: 1.0 standard drink of alcohol     Comment: Alcoholic Drinks/day: 1 per month    Drug use: Unknown     Types: Other     Comment: Drug use: No        Medications:    amoxicillin-clavulanate (AUGMENTIN) 875-125 MG tablet  azithromycin (ZITHROMAX) 250 MG tablet  ARIPiprazole (ABILIFY) 5 MG tablet  blood glucose (NO BRAND SPECIFIED) test strip  escitalopram (LEXAPRO) 20 MG tablet  glucagon 1 MG kit  HYDROcodone-acetaminophen (NORCO) 5-325 MG tablet  hydrOXYzine (VISTARIL) 25 MG capsule  insulin detemir (LEVEMIR) 100 UNIT/ML injection  insulin glargine U-300 (TOUJEO) 300 UNIT/ML (1 units dial) pen  Insulin Pen Needle (PEN NEEDLES) 29G X 12MM MISC  levothyroxine (SYNTHROID/LEVOTHROID) 75 MCG tablet  lisinopril (PRINIVIL/ZESTRIL) 10 MG tablet  metFORMIN (GLUCOPHAGE) 500 MG tablet  simvastatin (ZOCOR) 20 MG tablet          Review of Systems   Constitutional:  Negative for activity change, appetite change, chills, fatigue and fever.   HENT:  Positive for congestion. Negative for rhinorrhea and sore throat.    Eyes:  Negative for redness.   Respiratory:  Positive for cough and shortness of breath. Negative for wheezing and stridor.    Cardiovascular:  Positive for chest pain. Negative for palpitations and leg swelling.   Gastrointestinal:  Negative for diarrhea, nausea and vomiting.   Genitourinary:  Negative for dysuria and hematuria.   Musculoskeletal:  Negative for arthralgias, myalgias and neck stiffness.   Skin:  Negative for rash.   Neurological:  Positive for light-headedness. Negative for dizziness, syncope and headaches.       Physical Exam   BP: 92/66  Pulse: 120  Temp: 98.2  F (36.8  C)  Resp: 20  Height: 149.9 cm (4' 11\")  Weight: 83.9 kg (185 lb)  SpO2: 93 %      Physical Exam  Vitals and nursing note reviewed.   Constitutional:       Appearance: " Normal appearance.   HENT:      Mouth/Throat:      Mouth: Mucous membranes are moist.      Pharynx: No oropharyngeal exudate.   Cardiovascular:      Rate and Rhythm: Normal rate and regular rhythm.      Pulses: Normal pulses.   Pulmonary:      Effort: Pulmonary effort is normal.      Breath sounds: Rhonchi present. No rales.   Abdominal:      General: Abdomen is flat.   Musculoskeletal:      Cervical back: Normal range of motion.      Right lower leg: No edema.      Left lower leg: No edema.   Neurological:      Mental Status: She is alert.       EKG: Sinus tachycardia rate 106, normal ST-T segments.    Results for orders placed or performed during the hospital encounter of 01/28/24 (from the past 24 hour(s))   Asymptomatic Influenza A/B, RSV, & SARS-CoV2 PCR (COVID-19) Nose    Specimen: Nose; Swab   Result Value Ref Range    Influenza A PCR Negative Negative    Influenza B PCR Negative Negative    RSV PCR Negative Negative    SARS CoV2 PCR Negative Negative    Narrative    Testing was performed using the Xpert Xpress CoV2/Flu/RSV Assay on the [employer] GeneXpert Instrument. This test should be ordered for the detection of SARS-CoV-2, influenza, and RSV viruses in individuals who meet clinical and/or epidemiological criteria. Test performance is unknown in asymptomatic patients. This test is for in vitro diagnostic use under the FDA EUA for laboratories certified under CLIA to perform high or moderate complexity testing. This test has not been FDA cleared or approved. A negative result does not rule out the presence of PCR inhibitors in the specimen or target RNA in concentration below the limit of detection for the assay. If only one viral target is positive but coinfection with multiple targets is suspected, the sample should be re-tested with another FDA cleared, approved, or authorized test, if coinfection would change clinical management. This test was validated by the [facility] [employer]. These  laboratories are certified under the Clinical Laboratory Improvement Amendments of 1988 (CLIA-88) as qualified to perform high complexity laboratory testing.   CBC with platelets differential    Narrative    The following orders were created for panel order CBC with platelets differential.  Procedure                               Abnormality         Status                     ---------                               -----------         ------                     CBC with platelets and d...[[id number]]  Abnormal            Final result                 Please view results for these tests on the individual orders.   D dimer quantitative   Result Value Ref Range    D-Dimer Quantitative 1.20 (H) 0.00 - 0.50 ug/mL FEU    Narrative    This D-dimer assay is intended for use in conjunction with a clinical pretest probability assessment model to exclude pulmonary embolism (PE) and deep venous thrombosis (DVT) in outpatients suspected of PE or DVT. The cut-off value is 0.50 ug/mL FEU.   Comprehensive metabolic panel   Result Value Ref Range    Sodium 130 (L) 135 - 145 mmol/L    Potassium 4.8 3.4 - 5.3 mmol/L    Carbon Dioxide (CO2) 20 (L) 22 - 29 mmol/L    Anion Gap 15 7 - 15 mmol/L    Urea Nitrogen 23.6 (H) 6.0 - 20.0 mg/dL    Creatinine 0.91 0.51 - 0.95 mg/dL    GFR Estimate 78 >60 mL/min/1.73m2    Calcium 9.3 8.6 - 10.0 mg/dL    Chloride 95 (L) 98 - 107 mmol/L    Glucose 212 (H) 70 - 99 mg/dL    Alkaline Phosphatase 149 40 - 150 U/L    AST 31 0 - 45 U/L    ALT 50 0 - 50 U/L    Protein Total 8.2 6.4 - 8.3 g/dL    Albumin 3.7 3.5 - 5.2 g/dL    Bilirubin Total 0.8 <=1.2 mg/dL   Troponin T, High Sensitivity   Result Value Ref Range    Troponin T, High Sensitivity 11 <=14 ng/L   Lipase   Result Value Ref Range    Lipase 21 13 - 60 U/L   [place] Draw    Narrative    The following orders were created for panel order [place] Draw.  Procedure                               Abnormality         Status                     ---------                                -----------         ------                     Extra Red Top Tube[[id number]]                               Final result                 Please view results for these tests on the individual orders.   CBC with platelets and differential   Result Value Ref Range    WBC Count 11.9 (H) 4.0 - 11.0 10e3/uL    RBC Count 4.39 3.80 - 5.20 10e6/uL    Hemoglobin 12.5 11.7 - 15.7 g/dL    Hematocrit 37.2 35.0 - 47.0 %    MCV 85 78 - 100 fL    MCH 28.5 26.5 - 33.0 pg    MCHC 33.6 31.5 - 36.5 g/dL    RDW 13.4 10.0 - 15.0 %    Platelet Count 228 150 - 450 10e3/uL    % Neutrophils 72 %    % Lymphocytes 14 %    % Monocytes 13 %    % Eosinophils 0 %    % Basophils 0 %    % Immature Granulocytes 1 %    NRBCs per 100 WBC 0 <1 /100    Absolute Neutrophils 8.6 (H) 1.6 - 8.3 10e3/uL    Absolute Lymphocytes 1.6 0.8 - 5.3 10e3/uL    Absolute Monocytes 1.5 (H) 0.0 - 1.3 10e3/uL    Absolute Eosinophils 0.0 0.0 - 0.7 10e3/uL    Absolute Basophils 0.0 0.0 - 0.2 10e3/uL    Absolute Immature Granulocytes 0.1 <=0.4 10e3/uL    Absolute NRBCs 0.0 10e3/uL   Extra Red Top Tube   Result Value Ref Range    Hold Specimen x    XR Chest Port 1 View    Narrative    PROCEDURE INFORMATION:   Exam: XR Chest   Exam date and time: 1/28/2024 9:39 PM   Age: 48 years old   Clinical indication: Pain; Shortness of breath; Angina pectoris; Additional   info: SOB cp     TECHNIQUE:   Imaging protocol: Radiologic exam of the chest.   Views: 1 view.     COMPARISON:   CR XR RIBS CHEST RT 3VW 11/2/2023 8:55 PM     FINDINGS:   Tubes, catheters and devices: EKG leads overlie the chest.     Lungs: There is prominence of the left hilum. There is mild bibasilar and   bilateral infrahilar subsegmental atelectasis/consolidation. Hypoinflated   lungs. No pulmonary edema.   Pleural spaces: No pleural effusion or pneumothorax.   Heart/Mediastinum: The heart is normal for the projection and degree of   inspiration.   Bones/joints: Unremarkable.       Impression     IMPRESSION:   1.   Prominent left hilum may be secondary to adenopathy, consolidation   enlarged pulmonary artery, or mass lesion. If warranted, a CT scan with   contrast might be useful for further evaluation.   2.   Bibasilar and bilateral infrahilar subsegmental atelectasis/consolidation.   3.   Hypoinflated lungs.     THIS DOCUMENT HAS BEEN ELECTRONICALLY SIGNED BY JEFFREY MOMIN MD   CT Chest Pulmonary Embolism w Contrast    Narrative    PROCEDURE INFORMATION:   Exam: CT Chest With Contrast; Diagnostic   Exam date and time: 1/28/2024 10:25 PM   Age: 48 years old   Clinical indication: Pain and abnormal findings; Abnormal radiologic exam of   lung or chest; Pleuordynia; Additional info: Pleuritic pain, f/u abnl cxr     TECHNIQUE:   Imaging protocol: Diagnostic computed tomography of the chest with contrast.   3D rendering (Not supervised by radiologist): MIP and/or 3D reconstructed   images were created by the technologist.   Contrast material: ISOVUE-370; Contrast volume: 80 ml; Contrast route:   INTRAVENOUS (IV);      COMPARISON:   CR XR CHEST PORT 1 VIEW 1/28/2024 9:39 PM     FINDINGS:   Lungs: Geographic fairly well-demarcated consolidation in the posterior left   upper lobe.   Pleural spaces: Unremarkable. No pneumothorax. No pleural effusion.   Heart: Unremarkable. No cardiomegaly. No pericardial effusion.   Lymph nodes: Unremarkable. No enlarged lymph nodes.   Vasculature: Unremarkable. No aortic aneurysm.      Bones/joints: Unremarkable. No acute fracture.   Soft tissues: Unremarkable.       Impression    IMPRESSION:   Left upper lobe bronchopneumonia.     THIS DOCUMENT HAS BEEN ELECTRONICALLY SIGNED BY GRACIELA HERNANDEZ MD   Lactic acid whole blood   Result Value Ref Range    Lactic Acid 0.8 0.7 - 2.0 mmol/L   UA with Microscopic reflex to Culture    Specimen: Urine, Midstream   Result Value Ref Range    Color Urine Yellow Colorless, Straw, Light Yellow, Yellow    Appearance Urine Clear Clear    Glucose  Urine >=1000 (A) Negative mg/dL    Bilirubin Urine Negative Negative    Ketones Urine Negative Negative mg/dL    Specific Gravity Urine <=1.005 1.005 - 1.030    Blood Urine Negative Negative    pH Urine 5.5 5.0 - 9.0    Protein Albumin Urine Negative Negative mg/dL    Urobilinogen Urine Normal Normal, 2.0 mg/dL    Nitrite Urine Negative Negative    Leukocyte Esterase Urine Negative Negative    Bacteria Urine Few (A) None Seen /HPF    RBC Urine 3 (H) <=2 /HPF    WBC Urine 2 <=5 /HPF    Narrative    Urine Culture not indicated       Medications   sodium chloride 0.9% BOLUS 1,000 mL (0 mLs Intravenous Stopped 1/28/24 2220)   ketorolac (TORADOL) injection 15 mg (15 mg Intravenous $Given 1/28/24 2114)   iopamidol (ISOVUE-370) solution 80 mL (80 mLs Intravenous $Given 1/28/24 2230)   cefTRIAXone (ROCEPHIN) 1 g vial to attach to  mL bag for ADULTS or NS 50 mL bag for PEDS (1 g Intravenous $New Bag 1/28/24 2320)       Assessments & Plan (with Medical Decision Making)     I have reviewed the nursing notes.    I have reviewed the findings, diagnosis, plan and need for follow up with the patient.  11:30 PM--interval assessment of the patient, she is feeling much better after fluid bolus.  Blood pressures have occasionally been soft, they are lower than her usual blood pressures.  No recent changes to her blood pressure regimen.  Decreased oral intake since she has been sick.  Little bit lightheaded when she stands up before she came into the ED but feeling better now.  Tachycardia is improved here.  Discussed admission to the hospital, she is very reluctant to do so.  Will add a lactate to her labs, will have a repeat discussion about admission after lactate is resulted.  Rocephin x 1 in ED.  She does look much better, no oxygen requirement, tachycardia is improved and if her blood pressures were a little softer than normal probably would be considering admission at all.  I doubt sepsis, more likely just  hypovolemia.      Medical Decision Making  The patient's presentation was of moderate complexity (an acute illness with systemic symptoms).    The patient's evaluation involved:  history and exam without other MDM data elements    The patient's management necessitated high risk (a decision regarding hospitalization).    Offered hospitalization but patient feels better she prefers not to be hospitalized at this time.  Lactate is normal which is reassuring.  Blood pressure at time of my last exam was 106/66 which is certainly improved.  Tachycardia has resolved.  Due to comorbidities will treat with amoxicillin and azithromycin for home, strict precautions given to return if worsening.  Patient verbalized understanding of plan as did her partner that was with her.  Recommend follow-up with primary and 1 week, consider repeat x-ray when feeling better.  Patient verbalized understanding plan is agreement left ED improving condition.    New Prescriptions    AMOXICILLIN-CLAVULANATE (AUGMENTIN) 875-125 MG TABLET    Take 1 tablet by mouth 2 times daily    AZITHROMYCIN (ZITHROMAX) 250 MG TABLET    Take 2 tablets (500 mg) by mouth daily for 1 day, THEN 1 tablet (250 mg) daily for 4 days.       Final diagnoses:   Pneumonia due to infectious organism, unspecified laterality, unspecified part of lung       1/28/2024   [facility]       Tab Kendrick MD  01/28/24 1176

## 2024-01-29 NOTE — ED TRIAGE NOTES
"Patient here with left side rib pain that started yesterday and is worse today.  Patient reports a slight cough and has had a negative home COVID test.  Patient also reports that it hurts to take a deep breath.  Patient states motrin helps with the pain and last took it at 1900.    BP 92/66   Pulse 120   Temp 98.2  F (36.8  C) (Tympanic)   Resp 20   Ht 1.499 m (4' 11\")   Wt 83.9 kg (185 lb)   SpO2 93%   BMI 37.37 kg/m         Triage Assessment (Adult)       Row Name 01/28/24 2005          Triage Assessment    Airway WDL WDL        Respiratory WDL    Respiratory WDL X;cough     Cough Frequency infrequent        Skin Circulation/Temperature WDL    Skin Circulation/Temperature WDL WDL        Cardiac WDL    Cardiac WDL WDL        Peripheral/Neurovascular WDL    Peripheral Neurovascular WDL WDL        Cognitive/Neuro/Behavioral WDL    Cognitive/Neuro/Behavioral WDL WDL                     "

## 2024-01-29 NOTE — ED NOTES
Access pt's chart.    [facility] had concerns regarding drug interactions with the antibiotics that were prescribed.     Daniela Adan RN on 1/29/2024 at 10:59 AM

## 2024-02-02 LAB
BACTERIA BLD CULT: NO GROWTH
BACTERIA BLD CULT: NO GROWTH

## 2024-09-21 ENCOUNTER — HOSPITAL ENCOUNTER (EMERGENCY)
Facility: OTHER | Age: 49
Discharge: HOME | End: 2024-09-21
Attending: PHYSICIAN ASSISTANT
Payer: COMMERCIAL

## 2024-09-21 VITALS
DIASTOLIC BLOOD PRESSURE: 86 MMHG | WEIGHT: 185 LBS | HEIGHT: 59 IN | SYSTOLIC BLOOD PRESSURE: 129 MMHG | OXYGEN SATURATION: 97 % | TEMPERATURE: 99.1 F | HEART RATE: 79 BPM | BODY MASS INDEX: 37.29 KG/M2 | RESPIRATION RATE: 20 BRPM

## 2024-09-21 DIAGNOSIS — J06.9 URI (UPPER RESPIRATORY INFECTION): ICD-10-CM

## 2024-09-21 PROCEDURE — 99282 EMERGENCY DEPT VISIT SF MDM: CPT | Performed by: PHYSICIAN ASSISTANT

## 2024-09-21 ASSESSMENT — COLUMBIA-SUICIDE SEVERITY RATING SCALE - C-SSRS
1. IN THE PAST MONTH, HAVE YOU WISHED YOU WERE DEAD OR WISHED YOU COULD GO TO SLEEP AND NOT WAKE UP?: NO
6. HAVE YOU EVER DONE ANYTHING, STARTED TO DO ANYTHING, OR PREPARED TO DO ANYTHING TO END YOUR LIFE?: NO
2. HAVE YOU ACTUALLY HAD ANY THOUGHTS OF KILLING YOURSELF IN THE PAST MONTH?: NO

## 2024-09-21 NOTE — LETTER
September 21, 2024      To Whom It May Concern:      Liliana Yao was seen in our Emergency Department today, 09/21/24. She may return to work when fever free for 24 hours without medication and symptom improvement. Although she should wear a mask for at least 5 days.    Sincerely,        BETI Castillo

## 2024-09-21 NOTE — DISCHARGE INSTRUCTIONS
Get plenty of fluids and rest.  Alternate Tylenol and ibuprofen every 4 hours to help with achiness and fever control.  We discussed obtaining further studies this evening including lab work, imaging viral studies etc. versus continued close monitoring as an outpatient.  Shared decision-making was utilized and decided to continue as an out patient with conservative treatment and returning if worsening.  See attached information which discusses upper respiratory infections which your presentation seems most consistent with today.  Follow-up with PCP as needed.

## 2024-09-21 NOTE — ED TRIAGE NOTES
"ED Nursing Triage Note (General)   ________________________________    Liliana Yao is a 49 year old Female that presents to triage via private vehicle with complaints of congestion and pharyngitis.  Patient states sx have been ongoing for the past 4 days and states 2 home covid testes were negative.  Patient states she works at the [employer] and states coworkers have been sick, however, no known covid. Patient states fevers Thursday, however, none since then. Patient denies N/V/D.   Significant symptoms had onset 4 day(s) ago.  Vital signs:  Temp: 99.1  F (37.3  C) Temp src: Tympanic BP: 129/86 Pulse: 79   Resp: 20 SpO2: 97 %     Height: 149.9 cm (4' 11\") Weight: 83.9 kg (185 lb)  Estimated body mass index is 37.37 kg/m  as calculated from the following:    Height as of this encounter: 1.499 m (4' 11\").    Weight as of this encounter: 83.9 kg (185 lb).  PRE HOSPITAL PRIOR LIVING SITUATION Spouse      Triage Assessment (Adult)       Row Name 09/21/24 1504          Triage Assessment    Airway WDL WDL        Respiratory WDL    Respiratory WDL WDL        Skin Circulation/Temperature WDL    Skin Circulation/Temperature WDL WDL        Cardiac WDL    Cardiac WDL WDL     Cardiac Rhythm NSR        Peripheral/Neurovascular WDL    Peripheral Neurovascular WDL WDL     Capillary Refill, General less than/equal to 3 secs        Cognitive/Neuro/Behavioral WDL    Cognitive/Neuro/Behavioral WDL WDL        [place] Coma Scale    Best Eye Response 4-->(E4) spontaneous     Best Motor Response 6-->(M6) obeys commands     Best Verbal Response 5-->(V5) oriented     [place] Coma Scale Score 15                     "

## 2024-09-23 ASSESSMENT — ENCOUNTER SYMPTOMS
SHORTNESS OF BREATH: 0
DYSURIA: 0
CHILLS: 0
FATIGUE: 1
FEVER: 0
COUGH: 1
ABDOMINAL PAIN: 0
SORE THROAT: 1

## 2024-09-23 NOTE — ED PROVIDER NOTES
History     Chief Complaint   Patient presents with    Nasal Congestion    Pharyngitis     HPI  Liliana Yao is a 49 year old female who presents to the ED for evaluation of nasal congestion/pharyngitis. She presents to triage via private vehicle with complaints of congestion and pharyngitis.  Patient states sx have been ongoing for the past 4 days and states 2 home covid testes were negative.  Patient states she works at the [employer] and states coworkers have been sick, however, no known covid. Patient states fevers Thursday, however, none since then. Patient denies N/V/D.   Significant symptoms had onset 4 day(s) ago.    Allergies:  Allergies   Allergen Reactions    Acetone Shortness Of Breath     Other reaction(s): Erythema    Hazelnuts [Nuts] Rash    Sulindac Rash     weakness       Problem List:    Patient Active Problem List    Diagnosis Date Noted    Depression 04/20/2022     Priority: Medium    Panic attacks 01/31/2020     Priority: Medium    Morbid obesity with BMI of 40.0-44.9, adult (H) 12/06/2019     Priority: Medium    Hashimoto's thyroiditis 03/13/2019     Priority: Medium    Anxiety state 02/08/2018     Priority: Medium     Overview:   with psychotic features      Major depressive disorder, recurrent episode, moderate (H) 02/08/2018     Priority: Medium     Overview:   Clinical      Diabetes mellitus, type II (H) 02/08/2018     Priority: Medium     Overview:   Noncompliant  a1c = 8.9 on 4/14/15.   Referred to eye doctor.  On aspirin.  On statin/ace       Morbid obesity (H) 02/08/2018     Priority: Medium     Overview:   Noncompliant      Vitamin D deficiency 02/08/2018     Priority: Medium    Type 2 diabetes mellitus, uncontrolled 12/08/2015     Priority: Medium    Hx of self mutilation 08/17/2015     Priority: Medium     Overview:   none in last 10 years      Lumbago 08/17/2015     Priority: Medium    Goiter 12/31/2012     Priority: Medium    Low back pain syndrome 08/29/2011     Priority: Medium     Essential hypertension, benign 2011     Priority: Medium    Knee pain 02/15/2011     Priority: Medium     Overview:   left      Calcaneal spur of foot, left 2010     Priority: Medium    Plantar fasciitis 2010     Priority: Medium    Hearing loss 2010     Priority: Medium    Hypercholesterolemia 2007     Priority: Medium        Past Medical History:    Past Medical History:   Diagnosis Date    Allergic rhinitis     Coagulation defect (H24)     Diarrhea     Encounter for other general examination (CODE)     Obesity     Otitis media     Personal history of other mental and behavioral disorders     Sebaceous cyst     Torticollis     Uncomplicated asthma        Past Surgical History:    Past Surgical History:   Procedure Laterality Date    EXCISE CYST GENERIC (LOCATION)      The patient had right ganglion cyst removal.    TYMPANOSTOMY, LOCAL/TOPICAL ANESTHESIA      PE tubes at age 4 & 24    TYMPANOSTOMY, LOCAL/TOPICAL ANESTHESIA      2008,Tube placement by Dr. Paco Izquierdo       Family History:    Family History   Problem Relation Age of Onset    Hypertension Mother         Hypertension,Hypertension.    Other - See Comments Mother          Chronic heart failure.  Dense fibrocystic breast disease.    Breast Cancer Paternal Grandmother         Cancer-breast,breast cancer, mastectomy    Diabetes Father         Diabetes,Multiple members on father's side with diabetes    Breast Cancer Paternal Aunt         Cancer-breast, bilateral mastectomy for breast cancer       Social History:  Marital Status:   [2]  Social History     Tobacco Use    Smoking status: Former     Current packs/day: 0.00     Average packs/day: 0.5 packs/day for 12.0 years (6.0 ttl pk-yrs)     Types: Cigarettes     Start date: 1985     Quit date: 1997     Years since quittin.0    Smokeless tobacco: Never   Substance Use Topics    Alcohol use: Yes     Alcohol/week: 1.0 standard drink of alcohol      "Comment: Alcoholic Drinks/day: 1 per month    Drug use: Unknown     Types: Other     Comment: Drug use: No        Medications:    amoxicillin-clavulanate (AUGMENTIN) 875-125 MG tablet  ARIPiprazole (ABILIFY) 5 MG tablet  blood glucose (NO BRAND SPECIFIED) test strip  escitalopram (LEXAPRO) 20 MG tablet  glucagon 1 MG kit  HYDROcodone-acetaminophen (NORCO) 5-325 MG tablet  hydrOXYzine (VISTARIL) 25 MG capsule  insulin detemir (LEVEMIR) 100 UNIT/ML injection  insulin glargine U-300 (TOUJEO) 300 UNIT/ML (1 units dial) pen  Insulin Pen Needle (PEN NEEDLES) 29G X 12MM MISC  levothyroxine (SYNTHROID/LEVOTHROID) 75 MCG tablet  lisinopril (PRINIVIL/ZESTRIL) 10 MG tablet  metFORMIN (GLUCOPHAGE) 500 MG tablet  simvastatin (ZOCOR) 20 MG tablet          Review of Systems   Constitutional:  Positive for fatigue. Negative for chills and fever.   HENT:  Positive for congestion and sore throat.    Respiratory:  Positive for cough. Negative for shortness of breath.    Cardiovascular:  Negative for chest pain.   Gastrointestinal:  Negative for abdominal pain.   Genitourinary:  Negative for dysuria.   Neurological:  Negative for syncope.       Physical Exam   BP: 129/86  Pulse: 79  Temp: 99.1  F (37.3  C)  Resp: 20  Height: 149.9 cm (4' 11\")  Weight: 83.9 kg (185 lb)  SpO2: 97 %      Physical Exam  Constitutional:       General: She is not in acute distress.     Appearance: She is well-developed. She is not ill-appearing or diaphoretic.   HENT:      Head: Normocephalic and atraumatic.      Right Ear: Tympanic membrane normal.      Left Ear: Tympanic membrane normal.      Mouth/Throat:      Pharynx: No pharyngeal swelling, oropharyngeal exudate, posterior oropharyngeal erythema or uvula swelling.   Eyes:      General: No scleral icterus.     Conjunctiva/sclera: Conjunctivae normal.   Neck:      Vascular: No carotid bruit.   Cardiovascular:      Rate and Rhythm: Normal rate and regular rhythm.   Pulmonary:      Effort: Pulmonary effort " is normal.      Breath sounds: Normal breath sounds.   Abdominal:      Palpations: Abdomen is soft.      Tenderness: There is no abdominal tenderness.   Musculoskeletal:         General: No deformity.      Cervical back: Neck supple.      Right lower leg: No edema.      Left lower leg: No edema.   Skin:     General: Skin is warm and dry.      Coloration: Skin is not jaundiced.      Findings: No rash.   Neurological:      Mental Status: She is alert. Mental status is at baseline.   Psychiatric:         Mood and Affect: Mood normal.         Behavior: Behavior normal.         ED Course        Procedures              Critical Care time:  none               No results found for this or any previous visit (from the past 24 hour(s)).    Medications - No data to display    Assessments & Plan (with Medical Decision Making)   Pt nontoxic in NAD. Heart, lung, bowel sounds normal, abd soft, nontender to palpation, nondistended. VSS, afebrile.     In general, she appears very well.  I am encouraged by her well appearance as well as her stable vital signs and reassuring diagnostic studies.  We did discuss obtaining further studies today versus close monitoring at home.  shared decision-making was utilized and she would like to continue with close monitoring.  Presentation today seems most consistent with upper respiratory infection, most likely viral in nature.    From discharge:  Alternate Tylenol and ibuprofen every 4 hours to help with achiness and fever control.  We discussed obtaining further studies this evening including lab work, imaging viral studies etc. versus continued close monitoring as an outpatient.  Shared decision-making was utilized and decided to continue as an out patient with conservative treatment and returning if worsening.  See attached information which discusses upper respiratory infections which your presentation seems most consistent with today.  Follow-up with PCP as needed.    Strict return  precautions are given to the pt, they will return if symptoms are worsening or concerning. The pt understands and agrees with the plan and they are discharged.     Elías Jones PA-C      I have reviewed the nursing notes.    I have reviewed the findings, diagnosis, plan and need for follow up with the patient.          Discharge Medication List as of 9/21/2024  3:26 PM          Final diagnoses:   URI (upper respiratory infection)       9/21/2024   [facility]       Elías Jones PA  09/23/24 [id number]

## 2025-03-13 ENCOUNTER — HOSPITAL ENCOUNTER (EMERGENCY)
Facility: OTHER | Age: 50
Discharge: HOME | End: 2025-03-13
Attending: STUDENT IN AN ORGANIZED HEALTH CARE EDUCATION/TRAINING PROGRAM
Payer: COMMERCIAL

## 2025-03-13 ENCOUNTER — APPOINTMENT (OUTPATIENT)
Dept: GENERAL RADIOLOGY | Facility: OTHER | Age: 50
End: 2025-03-13
Attending: STUDENT IN AN ORGANIZED HEALTH CARE EDUCATION/TRAINING PROGRAM
Payer: COMMERCIAL

## 2025-03-13 ENCOUNTER — APPOINTMENT (OUTPATIENT)
Dept: CT IMAGING | Facility: OTHER | Age: 50
End: 2025-03-13
Attending: STUDENT IN AN ORGANIZED HEALTH CARE EDUCATION/TRAINING PROGRAM
Payer: COMMERCIAL

## 2025-03-13 VITALS
RESPIRATION RATE: 24 BRPM | BODY MASS INDEX: 39.96 KG/M2 | DIASTOLIC BLOOD PRESSURE: 80 MMHG | HEIGHT: 59 IN | SYSTOLIC BLOOD PRESSURE: 111 MMHG | HEART RATE: 89 BPM | OXYGEN SATURATION: 96 % | WEIGHT: 198.19 LBS

## 2025-03-13 DIAGNOSIS — F10.10 ETOH ABUSE: ICD-10-CM
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
## 2025-03-13 DIAGNOSIS — R11.2 NAUSEA AND VOMITING, UNSPECIFIED VOMITING TYPE: ICD-10-CM

## 2025-03-13 LAB
ALBUMIN SERPL BCG-MCNC: 4.1 G/DL (ref 3.5–5.2)
ALP SERPL-CCNC: 121 U/L (ref 40–150)
ALT SERPL W P-5'-P-CCNC: 64 U/L (ref 0–50)
ANION GAP SERPL CALCULATED.3IONS-SCNC: 15 MMOL/L (ref 7–15)
AST SERPL W P-5'-P-CCNC: 46 U/L (ref 0–45)
BASOPHILS # BLD AUTO: 0 10E3/UL (ref 0–0.2)
BASOPHILS NFR BLD AUTO: 1 %
BILIRUB SERPL-MCNC: 0.3 MG/DL
BUN SERPL-MCNC: 12.3 MG/DL (ref 6–20)
CALCIUM SERPL-MCNC: 9.2 MG/DL (ref 8.8–10.4)
CHLORIDE SERPL-SCNC: 99 MMOL/L (ref 98–107)
CK SERPL-CCNC: 61 U/L (ref 26–192)
CREAT SERPL-MCNC: 0.68 MG/DL (ref 0.51–0.95)
EGFRCR SERPLBLD CKD-EPI 2021: >90 ML/MIN/1.73M2
EOSINOPHIL # BLD AUTO: 0.3 10E3/UL (ref 0–0.7)
EOSINOPHIL NFR BLD AUTO: 5 %
ERYTHROCYTE [DISTWIDTH] IN BLOOD BY AUTOMATED COUNT: 13.1 % (ref 10–15)
ETHANOL SERPL-MCNC: 0.18 G/DL
GLUCOSE SERPL-MCNC: 306 MG/DL (ref 70–99)
HCO3 SERPL-SCNC: 22 MMOL/L (ref 22–29)
HCT VFR BLD AUTO: 41.6 % (ref 35–47)
HGB BLD-MCNC: 13.5 G/DL (ref 11.7–15.7)
HOLD SPECIMEN: NORMAL
IMM GRANULOCYTES # BLD: 0 10E3/UL
IMM GRANULOCYTES NFR BLD: 0 %
LIPASE SERPL-CCNC: 20 U/L (ref 13–60)
LYMPHOCYTES # BLD AUTO: 3.2 10E3/UL (ref 0.8–5.3)
LYMPHOCYTES NFR BLD AUTO: 44 %
MAGNESIUM SERPL-MCNC: 1.8 MG/DL (ref 1.7–2.3)
MCH RBC QN AUTO: 28 PG (ref 26.5–33)
MCHC RBC AUTO-ENTMCNC: 32.5 G/DL (ref 31.5–36.5)
MCV RBC AUTO: 86 FL (ref 78–100)
MONOCYTES # BLD AUTO: 0.6 10E3/UL (ref 0–1.3)
MONOCYTES NFR BLD AUTO: 8 %
NEUTROPHILS # BLD AUTO: 3.1 10E3/UL (ref 1.6–8.3)
NEUTROPHILS NFR BLD AUTO: 43 %
NRBC # BLD AUTO: 0 10E3/UL
NRBC BLD AUTO-RTO: 0 /100
PLATELET # BLD AUTO: 204 10E3/UL (ref 150–450)
POTASSIUM SERPL-SCNC: 3.4 MMOL/L (ref 3.4–5.3)
PROT SERPL-MCNC: 8.2 G/DL (ref 6.4–8.3)
RBC # BLD AUTO: 4.83 10E6/UL (ref 3.8–5.2)
SODIUM SERPL-SCNC: 136 MMOL/L (ref 135–145)
WBC # BLD AUTO: 7.3 10E3/UL (ref 4–11)

## 2025-03-13 PROCEDURE — 72125 CT NECK SPINE W/O DYE: CPT

## 2025-03-13 PROCEDURE — 82947 ASSAY GLUCOSE BLOOD QUANT: CPT | Performed by: STUDENT IN AN ORGANIZED HEALTH CARE EDUCATION/TRAINING PROGRAM

## 2025-03-13 PROCEDURE — 70450 CT HEAD/BRAIN W/O DYE: CPT

## 2025-03-13 PROCEDURE — 99284 EMERGENCY DEPT VISIT MOD MDM: CPT | Performed by: STUDENT IN AN ORGANIZED HEALTH CARE EDUCATION/TRAINING PROGRAM

## 2025-03-13 PROCEDURE — 96361 HYDRATE IV INFUSION ADD-ON: CPT | Performed by: STUDENT IN AN ORGANIZED HEALTH CARE EDUCATION/TRAINING PROGRAM

## 2025-03-13 PROCEDURE — 85004 AUTOMATED DIFF WBC COUNT: CPT | Performed by: STUDENT IN AN ORGANIZED HEALTH CARE EDUCATION/TRAINING PROGRAM

## 2025-03-13 PROCEDURE — 82550 ASSAY OF CK (CPK): CPT | Performed by: STUDENT IN AN ORGANIZED HEALTH CARE EDUCATION/TRAINING PROGRAM

## 2025-03-13 PROCEDURE — 72170 X-RAY EXAM OF PELVIS: CPT

## 2025-03-13 PROCEDURE — 85048 AUTOMATED LEUKOCYTE COUNT: CPT | Performed by: STUDENT IN AN ORGANIZED HEALTH CARE EDUCATION/TRAINING PROGRAM

## 2025-03-13 PROCEDURE — 83735 ASSAY OF MAGNESIUM: CPT | Performed by: STUDENT IN AN ORGANIZED HEALTH CARE EDUCATION/TRAINING PROGRAM

## 2025-03-13 PROCEDURE — 96374 THER/PROPH/DIAG INJ IV PUSH: CPT | Performed by: STUDENT IN AN ORGANIZED HEALTH CARE EDUCATION/TRAINING PROGRAM

## 2025-03-13 PROCEDURE — 82077 ASSAY SPEC XCP UR&BREATH IA: CPT | Performed by: STUDENT IN AN ORGANIZED HEALTH CARE EDUCATION/TRAINING PROGRAM

## 2025-03-13 PROCEDURE — 71045 X-RAY EXAM CHEST 1 VIEW: CPT

## 2025-03-13 PROCEDURE — 84520 ASSAY OF UREA NITROGEN: CPT | Performed by: STUDENT IN AN ORGANIZED HEALTH CARE EDUCATION/TRAINING PROGRAM

## 2025-03-13 PROCEDURE — 250N000011 HC RX IP 250 OP 636: Performed by: STUDENT IN AN ORGANIZED HEALTH CARE EDUCATION/TRAINING PROGRAM

## 2025-03-13 PROCEDURE — 258N000003 HC RX IP 258 OP 636: Performed by: STUDENT IN AN ORGANIZED HEALTH CARE EDUCATION/TRAINING PROGRAM

## 2025-03-13 PROCEDURE — 99285 EMERGENCY DEPT VISIT HI MDM: CPT | Mod: 25 | Performed by: STUDENT IN AN ORGANIZED HEALTH CARE EDUCATION/TRAINING PROGRAM

## 2025-03-13 PROCEDURE — 83690 ASSAY OF LIPASE: CPT | Performed by: STUDENT IN AN ORGANIZED HEALTH CARE EDUCATION/TRAINING PROGRAM

## 2025-03-13 PROCEDURE — 36415 COLL VENOUS BLD VENIPUNCTURE: CPT | Performed by: STUDENT IN AN ORGANIZED HEALTH CARE EDUCATION/TRAINING PROGRAM

## 2025-03-13 RX ORDER — ONDANSETRON 2 MG/ML
4 INJECTION INTRAMUSCULAR; INTRAVENOUS ONCE
Status: COMPLETED | OUTPATIENT
Start: 2025-03-13 | End: 2025-03-13

## 2025-03-13 RX ADMIN — ONDANSETRON 4 MG: 2 INJECTION INTRAMUSCULAR; INTRAVENOUS at 01:51

## 2025-03-13 RX ADMIN — SODIUM CHLORIDE 1000 ML: 0.9 INJECTION, SOLUTION INTRAVENOUS at 01:51

## 2025-03-13 ASSESSMENT — ACTIVITIES OF DAILY LIVING (ADL)
ADLS_ACUITY_SCORE: 41
ADLS_ACUITY_SCORE: 41

## 2025-03-13 ASSESSMENT — COLUMBIA-SUICIDE SEVERITY RATING SCALE - C-SSRS
6. HAVE YOU EVER DONE ANYTHING, STARTED TO DO ANYTHING, OR PREPARED TO DO ANYTHING TO END YOUR LIFE?: NO
2. HAVE YOU ACTUALLY HAD ANY THOUGHTS OF KILLING YOURSELF IN THE PAST MONTH?: NO
1. IN THE PAST MONTH, HAVE YOU WISHED YOU WERE DEAD OR WISHED YOU COULD GO TO SLEEP AND NOT WAKE UP?: NO

## 2025-03-13 NOTE — ED PROVIDER NOTES
ED PROVIDER NOTE  Patient Name: Liliana Yao  MRN: [id number]    CC:    Chief Complaint   Patient presents with    Alcohol Intoxication         MDM  49 year old female presenting with etoh and fall.      In the ED, There were no vitals taken for this visit.    Physical exam revealed clear auscultation bilaterally.  Benign abdominal exam.  Grossly neurologically intact.  In no acute distress, no accessory muscle use.  Overall does not look critically ill or toxic .      I have independently reviewed and interpreted the patients test results which I have ordered this visit which are the following:      ED Course as of 03/13/25 0326   Thu Mar 13, 2025   0201 WBC: 7.3   0201 Platelet Count: 204   0256 Alcohol ethyl(!): 0.18   0256 CK Total: 61   0256 Magnesium: 1.8   0256 Lipase: 20   0256 Sodium: 136   0256 Potassium: 3.4   0256 Glucose(!): 306   0256 Alkaline Phosphatase: 121   0256 ALT(!): 64   0256 AST(!): 46   0258 CT Head w/o Contrast  IMPRESSION:  1.  No acute intracranial process.     0259 CT Cervical Spine w/o Contrast  IMPRESSION:  1.  No fracture or posttraumatic subluxation.     0315 XR Chest Port 1 View  IMPRESSION: Negative chest.   0315 XR Pelvis Port 1/2 Views  IMPRESSION: Single frontal view of the pelvis. No evidence of acute fracture or dislocation.         Alcohol intoxication  Fall  Nausea and vomiting  Hx of T2DM  Patient had been found on the ground, after having significant alcohol use by family member.  Laboratory results here were remarkable for EtOH elevation at 0.18.  Otherwise hemoglobin 13.5, no signs of infection.  White blood count of 7.3.  CT head and cervical spine were unremarkable, chest x-ray and pelvis x-ray revealed no signs of fracture dislocation I did discuss with the patient after fluids and Zofran.  Observation to refrain from excessive alcohol use, she understood.  She stated that she was little embarrassed but I reassured her that this is very common.  She was sent home,  picked up by her spouse.  All questions answered.  - NS 1000 ml  - zofran IV  - The patient was advised to follow up with PCP in 2-3 days and to return to the ED if symptoms worsened, or if there were any other urgent or life-threatening concerns.  - Following counseling on the work-up, results, diagnosis, and treatment plan, the patient was amenable to discharge after all questions were answered. The patient expressed agreement and understanding to the plan.      Clinical impression  Alcohol intoxication  Fall  Nausea and vomiting    Disposition  Home      HPI    Liliana Yao is a 49 year old female with PMH of depression, anxiety, hypertension, elevated BMI, type 2 diabetes who presents with alcohol intoxication.     History of obtained by: The patient    Patient has limited ability to communicate exactly what happened today.  She does states she admits to drinking alcohol and had a fall.  She was found by one of the ground in the bathroom for unknown period a long time.  She has had some nausea and vomiting on arrival    PMH and SH reviewed.    10 point ROS reviewed and negative except as stated in HPI.    Past medical history:  Past Medical History:   Diagnosis Date    Allergic rhinitis     7/23/2010    Coagulation defect     She has cognitive delay    Diarrhea     Diarrhea since 03/06    Encounter for other general examination (CODE)     Foot exam negative.  The patient is due for her annual eye exam.    Obesity     Morbid obesity, noncompliant    Otitis media     4/4/2012    Personal history of other mental and behavioral disorders     none in last 10 years    Sebaceous cyst     Right palmar ganglion cyst    Torticollis     She has recurrent wryneck    Uncomplicated asthma     No Comments Provided       Social history:  Social Connections: Not on file     Social History     Socioeconomic History    Marital status:    Tobacco Use    Smoking status: Former     Current packs/day: 0.00     Average  packs/day: 0.5 packs/day for 12.0 years (6.0 ttl pk-yrs)     Types: Cigarettes     Start date: 1985     Quit date: 1997     Years since quittin.5    Smokeless tobacco: Never   Substance and Sexual Activity    Alcohol use: Yes     Alcohol/week: 1.0 standard drink of alcohol     Comment: Alcoholic Drinks/day: 1 per month    Drug use: Unknown     Types: Other     Comment: Drug use: No    Sexual activity: Yes     Partners: Female   Social History Narrative    She is single. Has significant other.       [employer].  Shares an apartment with a roommate.     Preload  2013.     Social Drivers of Health     Financial Resource Strain: Low Risk  (2024)    [facility]    Overall Financial Resource Strain (CARDIA)     Difficulty of Paying Living Expenses: Not hard at all   Food Insecurity: Food Insecurity Present (3/6/2025)    [facility]    Hunger Vital Sign     Worried About Running Out of Food in the Last Year: Sometimes true     Ran Out of Food in the Last Year: Often true   Transportation Needs: Patient Declined (3/6/2025)    [facility]    PRAPARE - Transportation     Lack of Transportation (Medical): Patient declined     Lack of Transportation (Non-Medical): Patient declined   Housing Stability: Unknown (3/6/2025)    [facility]    Housing Stability Vital Sign     Unable to Pay for Housing in the Last Year: Patient declined     Number of Times Moved in the Last Year: 0     Homeless in the Last Year: No         I have reviewed the patients prescribed medications:  No current facility-administered medications for this encounter.    Current Outpatient Medications:     amoxicillin-clavulanate (AUGMENTIN) 875-125 MG tablet, Take 1 tablet by mouth 2 times daily, Disp: 28 tablet, Rfl: 0    ARIPiprazole (ABILIFY) 5 MG  tablet, Take 5 mg by mouth daily, Disp: , Rfl:     blood glucose (NO BRAND SPECIFIED) test strip, As directed. Dispense test strips covered by the patient insurance. Test 3-4 times per day., Disp: , Rfl:     escitalopram (LEXAPRO) 20 MG tablet, Take 20 mg by mouth daily, Disp: , Rfl:     glucagon 1 MG kit, Inject 1 mg into the muscle, Disp: , Rfl:     HYDROcodone-acetaminophen (NORCO) 5-325 MG tablet, Take 1 tablet by mouth every 6 hours as needed for severe pain, Disp: 6 tablet, Rfl: 0    hydrOXYzine (VISTARIL) 25 MG capsule, Take 1 capsule (25 mg) by mouth 3 times daily as needed for itching, Disp: 15 capsule, Rfl: 0    insulin detemir (LEVEMIR) 100 UNIT/ML injection, Take 35 units subcutaneous in the morning and 35 units subcutaneous in the evening, Disp: , Rfl:     insulin glargine U-300 (TOUJEO) 300 UNIT/ML (1 units dial) pen, Inject 72 Units Subcutaneous At Bedtime, Disp: , Rfl:     Insulin Pen Needle (PEN NEEDLES) 29G X 12MM MISC, As directed. 31 G x 6 MM . Use with Victoza to inject subcu once daily, Disp: , Rfl:     levothyroxine (SYNTHROID/LEVOTHROID) 75 MCG tablet, Take 75 mcg by mouth daily, Disp: , Rfl:     lisinopril (PRINIVIL/ZESTRIL) 10 MG tablet, Take 10 mg by mouth daily, Disp: , Rfl:     metFORMIN (GLUCOPHAGE) 500 MG tablet, Take 1,000 mg by mouth 2 times daily (with meals), Disp: , Rfl:     simvastatin (ZOCOR) 20 MG tablet, Take 20 mg by mouth daily, Disp: , Rfl:     Allergies:  Allergies   Allergen Reactions    Acetone Shortness Of Breath     Other reaction(s): Erythema    Hazelnuts [Nuts] Rash    Sulindac Rash     weakness         There were no vitals filed for this visit.    Physical Exam  Vitals: There were no vitals taken for this visit.  Constitutional: awake, NAD  Eyes: No conjunctival injection and normal lids, PERRL, EOMI  ENT: external nose and ears atraumatic  Neck: Symmetric, trachea midline, Supple  CV: RRR, no murmurs appreciated.  Pulm: Unlabored respiratory effort, good air  movement, CTAB, no w/c/r appreciated.  GI: Soft, nontender, nondistended.  No rebound or guarding  MSK: No deformities.  No cyanosis.  No midline tenderness or step-off  Neuro: AOx4, normal speech, following commands, grossly symmetric strength in all extremities.  Cranial nerves III-XII grossly intact.    Skin: warm & dry, no rashes or lesions  Psych: Appropriate mood & affect    Past medical history, past surgical history, problem list, family history, social history, medication list, and allergies were reviewed as documented in epic snapshot.  Relevant review of systems are documented within the HPI above.    Complexity of the Problems Addressed    5 (High) - 1 or more chronic illnesses with severe exacerbation, progression, or side effects of treatment or 1 acute chronic illness or injury that poses threat to live or bodily function    Complexity of Data  I have reviewed the following pertinent historical labs, diagnoses, tests, and/or imaging which contribute to complexity of this patient's care.   5 - (Extensive) - (2 of three above)    Complication Risk  Based on my interpretation of the current labs, historical tests and/or external tests. Patient does have a risk level as stated below of morbidity, threat to life, and bodily function, and severe exacerbation if not treated.   5 - High (drug therapy requiring intensive monitoring, elective major surgery with risk factors, emergency major surgery, hospitalization or excalation of hospital level care, decision not to resuscitate or to de-escalate care because of poor prognosis, parenteral controlled substances)      ED Events, Labs and Imaging:  ED Course as of 03/13/25 0326   Thu Mar 13, 2025   0201 WBC: 7.3   0201 Platelet Count: 204   0256 Alcohol ethyl(!): 0.18   0256 CK Total: 61   0256 Magnesium: 1.8   0256 Lipase: 20   0256 Sodium: 136   0256 Potassium: 3.4   0256 Glucose(!): 306   0256 Alkaline Phosphatase: 121   0256 ALT(!): 64   0256 AST(!): 46    0258 CT Head w/o Contrast  IMPRESSION:  1.  No acute intracranial process.     0259 CT Cervical Spine w/o Contrast  IMPRESSION:  1.  No fracture or posttraumatic subluxation.     0315 XR Chest Port 1 View  IMPRESSION: Negative chest.   0315 XR Pelvis Port 1/2 Views  IMPRESSION: Single frontal view of the pelvis. No evidence of acute fracture or dislocation.       Froylan Kebede MD  Emergency Medicine    Dictation Disclaimer: Some notes are completed with voice-recognition dictation software. Errors are generally corrected in real time. Please contact me via Epic staff message if you note any errors requiring clarification.     Sam Kebede MD  03/13/25 0322

## 2025-03-13 NOTE — DISCHARGE INSTRUCTIONS
Follow-up with your primary care doctor the next 2 to 3 days.    For mild to moderate pain or fever you can take ibuprofen and/or Tylenol if you do not have allergies to these.    Drink plenty of fluids.  Refrain from excessive alcohol use.    You have any worsening or concerning symptoms please call 911 or return to the emergency department immediately.

## 2025-03-13 NOTE — ED TRIAGE NOTES
Pt was found on bathroom floor by wife, pt states she does not know if she fell because she had too much to drink, pt vomiting on arrival. Denies daily ETOH use. Denies any pain at this time.      Triage Assessment (Adult)       Row Name 03/13/25 0128          Triage Assessment    Airway WDL WDL        Respiratory WDL    Respiratory WDL WDL        Skin Circulation/Temperature WDL    Skin Circulation/Temperature WDL WDL        Cardiac WDL    Cardiac WDL WDL        Peripheral/Neurovascular WDL    Peripheral Neurovascular WDL WDL        Cognitive/Neuro/Behavioral WDL    Cognitive/Neuro/Behavioral WDL WDL

## 2025-09-02 ENCOUNTER — HOSPITAL ENCOUNTER (OUTPATIENT)
Dept: ULTRASOUND IMAGING | Facility: OTHER | Age: 50
Discharge: HOME | End: 2025-09-02
Attending: NURSE PRACTITIONER
Payer: MEDICARE

## 2025-09-02 ENCOUNTER — HOSPITAL ENCOUNTER (OUTPATIENT)
Dept: MAMMOGRAPHY | Facility: OTHER | Age: 50
Discharge: HOME | End: 2025-09-02
Attending: NURSE PRACTITIONER
Payer: MEDICARE

## 2025-09-02 DIAGNOSIS — N64.52 BLOODY DISCHARGE FROM RIGHT NIPPLE: ICD-10-CM

## 2025-09-02 DIAGNOSIS — Z12.31 ENCOUNTER FOR SCREENING MAMMOGRAM FOR MALIGNANT NEOPLASM OF BREAST: ICD-10-CM

## 2025-09-02 PROCEDURE — 76642 ULTRASOUND BREAST LIMITED: CPT | Mod: LT

## 2025-09-02 PROCEDURE — 77066 DX MAMMO INCL CAD BI: CPT

## 2025-09-02 PROCEDURE — 76642 ULTRASOUND BREAST LIMITED: CPT | Mod: 26 | Performed by: RADIOLOGY

## (undated) RX ORDER — KETOROLAC TROMETHAMINE 15 MG/ML
INJECTION, SOLUTION INTRAMUSCULAR; INTRAVENOUS
Status: DISPENSED
Start: 2024-01-28

## (undated) RX ORDER — OXYCODONE AND ACETAMINOPHEN 5; 325 MG/1; MG/1
TABLET ORAL
Status: DISPENSED
Start: 2018-03-25

## (undated) RX ORDER — HYDROXYZINE PAMOATE 25 MG/1
CAPSULE ORAL
Status: DISPENSED
Start: 2023-11-02

## (undated) RX ORDER — CEFTRIAXONE SODIUM 1 G
VIAL (EA) INJECTION
Status: DISPENSED
Start: 2018-03-25

## (undated) RX ORDER — LIDOCAINE HYDROCHLORIDE 10 MG/ML
INJECTION, SOLUTION EPIDURAL; INFILTRATION; INTRACAUDAL; PERINEURAL
Status: DISPENSED
Start: 2023-11-22

## (undated) RX ORDER — ACETAMINOPHEN 325 MG/1
TABLET ORAL
Status: DISPENSED
Start: 2023-11-22

## (undated) RX ORDER — SODIUM CHLORIDE 9 MG/ML
INJECTION, SOLUTION INTRAVENOUS
Status: DISPENSED
Start: 2019-03-08

## (undated) RX ORDER — ONDANSETRON 2 MG/ML
INJECTION INTRAMUSCULAR; INTRAVENOUS
Status: DISPENSED
Start: 2025-03-13

## (undated) RX ORDER — ACETAMINOPHEN 500 MG
TABLET ORAL
Status: DISPENSED
Start: 2019-12-07

## (undated) RX ORDER — CYCLOBENZAPRINE HCL 10 MG
TABLET ORAL
Status: DISPENSED
Start: 2018-02-27

## (undated) RX ORDER — FENTANYL CITRATE 50 UG/ML
INJECTION, SOLUTION INTRAMUSCULAR; INTRAVENOUS
Status: DISPENSED
Start: 2018-02-27

## (undated) RX ORDER — LORAZEPAM 1 MG/1
TABLET ORAL
Status: DISPENSED
Start: 2020-02-24

## (undated) RX ORDER — DIPHENHYDRAMINE HYDROCHLORIDE 50 MG/ML
INJECTION INTRAMUSCULAR; INTRAVENOUS
Status: DISPENSED
Start: 2019-03-08

## (undated) RX ORDER — LORAZEPAM 1 MG/1
TABLET ORAL
Status: DISPENSED
Start: 2019-12-07

## (undated) RX ORDER — LIDOCAINE HYDROCHLORIDE 10 MG/ML
INJECTION, SOLUTION INFILTRATION; PERINEURAL
Status: DISPENSED
Start: 2018-03-25

## (undated) RX ORDER — HYDROCODONE BITARTRATE AND ACETAMINOPHEN 5; 325 MG/1; MG/1
TABLET ORAL
Status: DISPENSED
Start: 2023-11-02

## (undated) RX ORDER — SODIUM CHLORIDE 9 MG/ML
INJECTION, SOLUTION INTRAVENOUS
Status: DISPENSED
Start: 2018-11-27

## (undated) RX ORDER — SULFAMETHOXAZOLE/TRIMETHOPRIM 800-160 MG
TABLET ORAL
Status: DISPENSED
Start: 2020-02-24

## (undated) RX ORDER — LORAZEPAM 2 MG/ML
INJECTION INTRAMUSCULAR
Status: DISPENSED
Start: 2018-02-27

## (undated) RX ORDER — CEFTRIAXONE SODIUM 1 G
VIAL (EA) INJECTION
Status: DISPENSED
Start: 2024-01-28

## (undated) RX ORDER — LORAZEPAM 2 MG/ML
INJECTION INTRAMUSCULAR
Status: DISPENSED
Start: 2019-03-08